# Patient Record
Sex: FEMALE | Race: WHITE | NOT HISPANIC OR LATINO | Employment: PART TIME | URBAN - METROPOLITAN AREA
[De-identification: names, ages, dates, MRNs, and addresses within clinical notes are randomized per-mention and may not be internally consistent; named-entity substitution may affect disease eponyms.]

---

## 2017-03-17 ENCOUNTER — ALLSCRIPTS OFFICE VISIT (OUTPATIENT)
Dept: OTHER | Facility: OTHER | Age: 67
End: 2017-03-17

## 2017-03-17 DIAGNOSIS — Z13.820 ENCOUNTER FOR SCREENING FOR OSTEOPOROSIS: ICD-10-CM

## 2017-04-27 ENCOUNTER — GENERIC CONVERSION - ENCOUNTER (OUTPATIENT)
Dept: OTHER | Facility: OTHER | Age: 67
End: 2017-04-27

## 2017-06-16 ENCOUNTER — ALLSCRIPTS OFFICE VISIT (OUTPATIENT)
Dept: OTHER | Facility: OTHER | Age: 67
End: 2017-06-16

## 2017-08-14 DIAGNOSIS — F32.9 MAJOR DEPRESSIVE DISORDER, SINGLE EPISODE: ICD-10-CM

## 2017-08-14 DIAGNOSIS — M54.9 DORSALGIA: ICD-10-CM

## 2017-08-14 DIAGNOSIS — I10 ESSENTIAL (PRIMARY) HYPERTENSION: ICD-10-CM

## 2017-10-17 ENCOUNTER — APPOINTMENT (OUTPATIENT)
Dept: LAB | Facility: MEDICAL CENTER | Age: 67
End: 2017-10-17
Payer: MEDICARE

## 2017-10-17 DIAGNOSIS — M54.9 DORSALGIA: ICD-10-CM

## 2017-10-17 DIAGNOSIS — I10 ESSENTIAL (PRIMARY) HYPERTENSION: ICD-10-CM

## 2017-10-17 LAB
ALBUMIN SERPL BCP-MCNC: 3.7 G/DL (ref 3.5–5)
ALP SERPL-CCNC: 65 U/L (ref 46–116)
ALT SERPL W P-5'-P-CCNC: 28 U/L (ref 12–78)
ANION GAP SERPL CALCULATED.3IONS-SCNC: 7 MMOL/L (ref 4–13)
AST SERPL W P-5'-P-CCNC: 11 U/L (ref 5–45)
BASOPHILS # BLD AUTO: 0.03 THOUSANDS/ΜL (ref 0–0.1)
BASOPHILS NFR BLD AUTO: 0 % (ref 0–1)
BILIRUB SERPL-MCNC: 0.48 MG/DL (ref 0.2–1)
BUN SERPL-MCNC: 19 MG/DL (ref 5–25)
CALCIUM SERPL-MCNC: 8.5 MG/DL (ref 8.3–10.1)
CHLORIDE SERPL-SCNC: 104 MMOL/L (ref 100–108)
CHOLEST SERPL-MCNC: 192 MG/DL (ref 50–200)
CO2 SERPL-SCNC: 27 MMOL/L (ref 21–32)
CREAT SERPL-MCNC: 0.88 MG/DL (ref 0.6–1.3)
EOSINOPHIL # BLD AUTO: 0.16 THOUSAND/ΜL (ref 0–0.61)
EOSINOPHIL NFR BLD AUTO: 2 % (ref 0–6)
ERYTHROCYTE [DISTWIDTH] IN BLOOD BY AUTOMATED COUNT: 13.1 % (ref 11.6–15.1)
GFR SERPL CREATININE-BSD FRML MDRD: 67 ML/MIN/1.73SQ M
GLUCOSE P FAST SERPL-MCNC: 102 MG/DL (ref 65–99)
HCT VFR BLD AUTO: 45 % (ref 34.8–46.1)
HDLC SERPL-MCNC: 60 MG/DL (ref 40–60)
HGB BLD-MCNC: 14.6 G/DL (ref 11.5–15.4)
LDLC SERPL CALC-MCNC: 110 MG/DL (ref 0–100)
LYMPHOCYTES # BLD AUTO: 2.4 THOUSANDS/ΜL (ref 0.6–4.47)
LYMPHOCYTES NFR BLD AUTO: 36 % (ref 14–44)
MCH RBC QN AUTO: 30.5 PG (ref 26.8–34.3)
MCHC RBC AUTO-ENTMCNC: 32.4 G/DL (ref 31.4–37.4)
MCV RBC AUTO: 94 FL (ref 82–98)
MONOCYTES # BLD AUTO: 0.48 THOUSAND/ΜL (ref 0.17–1.22)
MONOCYTES NFR BLD AUTO: 7 % (ref 4–12)
NEUTROPHILS # BLD AUTO: 3.65 THOUSANDS/ΜL (ref 1.85–7.62)
NEUTS SEG NFR BLD AUTO: 55 % (ref 43–75)
NRBC BLD AUTO-RTO: 0 /100 WBCS
PLATELET # BLD AUTO: 318 THOUSANDS/UL (ref 149–390)
PMV BLD AUTO: 11.4 FL (ref 8.9–12.7)
POTASSIUM SERPL-SCNC: 3.9 MMOL/L (ref 3.5–5.3)
PROT SERPL-MCNC: 7.1 G/DL (ref 6.4–8.2)
RBC # BLD AUTO: 4.78 MILLION/UL (ref 3.81–5.12)
SODIUM SERPL-SCNC: 138 MMOL/L (ref 136–145)
TRIGL SERPL-MCNC: 109 MG/DL
TSH SERPL DL<=0.05 MIU/L-ACNC: 2.78 UIU/ML (ref 0.36–3.74)
WBC # BLD AUTO: 6.73 THOUSAND/UL (ref 4.31–10.16)

## 2017-10-17 PROCEDURE — 84443 ASSAY THYROID STIM HORMONE: CPT

## 2017-10-17 PROCEDURE — 80061 LIPID PANEL: CPT

## 2017-10-17 PROCEDURE — 80053 COMPREHEN METABOLIC PANEL: CPT

## 2017-10-17 PROCEDURE — 36415 COLL VENOUS BLD VENIPUNCTURE: CPT

## 2017-10-17 PROCEDURE — 85025 COMPLETE CBC W/AUTO DIFF WBC: CPT

## 2017-10-19 ENCOUNTER — GENERIC CONVERSION - ENCOUNTER (OUTPATIENT)
Dept: OTHER | Facility: OTHER | Age: 67
End: 2017-10-19

## 2017-10-24 ENCOUNTER — ALLSCRIPTS OFFICE VISIT (OUTPATIENT)
Dept: OTHER | Facility: OTHER | Age: 67
End: 2017-10-24

## 2017-10-25 NOTE — PROGRESS NOTES
Assessment  1  Hypertension (401 9) (I10)   2  Depression (311) (F32 9)   3  Anxiety (300 00) (F41 9)    Plan  Depression    · PARoxetine HCl - 20 MG Oral Tablet; TAKE 1 TABLET DAILY AS DIRECTED  Hypertension    · Triamterene-HCTZ 37 5-25 MG Oral Tablet; TAKE ONE TABLET BY MOUTH  ONCE DAILY    Chief Complaint  The patient is here today for a follow up  History of Present Illness  The patient states she has been doing well with her blood pressure control since the last visit  She has no comorbid illnesses  She has no significant interval events  Symptoms: The patient is currently asymptomatic  The patient is being seen for an initial evaluation of anxiety disorder  The patient is currently asymptomatic  No associated symptoms are reported  Suicidal risk:  no suicidal thoughts-- and-- no suicidal intention  Current treatment includes selective serotonin reuptake inhibitors  There are no medication side effects  Review of Systems    Constitutional: No fever, no chills, feels well, no tiredness, no recent weight gain or weight loss  Eyes: No complaints of eye pain, no red eyes, no eyesight problems, no discharge, no dry eyes, no itching of eyes  ENT: no complaints of earache, no loss of hearing, no nose bleeds, no nasal discharge, no sore throat, no hoarseness  Cardiovascular: No complaints of slow heart rate, no fast heart rate, no chest pain, no palpitations, no leg claudication, no lower extremity edema  Respiratory: No complaints of shortness of breath, no wheezing, no cough, no SOB on exertion, no orthopnea, no PND  Gastrointestinal: No complaints of abdominal pain, no constipation, no nausea or vomiting, no diarrhea, no bloody stools  Genitourinary: No complaints of dysuria, no incontinence, no pelvic pain, no dysmenorrhea, no vaginal discharge or bleeding  Musculoskeletal: No complaints of arthralgias, no myalgias, no joint swelling or stiffness, no limb pain or swelling        Active Problems  1  Adjustment insomnia (307 41) (F51 02)   2  Anxiety (300 00) (F41 9)   3  Back pain (724 5) (M54 9)   4  Back strain (847 9) (S39 012A)   5  Colon cancer screening (V76 51) (Z12 11)   6  Contact dermatitis due to poison ivy (692 6) (L23 7)   7  Depression (311) (F32 9)   8  Encounter for screening mammogram for malignant neoplasm of breast (V76 12)   (Z12 31)   9  Flu vaccine need (V04 81) (Z23)   10  Hypertension (401 9) (I10)   11  Need for pneumococcal vaccine (V03 82) (Z23)   12  Need for shingles vaccine (V04 89) (Z23)   13  Need for tuberculosis vaccination (V03 2) (Z23)   14  Nevus of back (216 5) (D22 5)   15  Routine history and physical examination of adult (V70 0) (Z00 00)   16  Screening for osteoporosis (V82 81) (Z13 820)   17  Screening for tuberculosis (V74 1) (Z11 1)   18  Uterine cancer (179) (C55)    Past Medical History  1  Denied: History of Denial Of Any Significant Medical History    Surgical History  1  History of Total Abdominal Hysterectomy With Removal Of Both Ovaries    Family History  Mother    1  Family history of Diabetes Mellitus (V18 0)   2  Family history of Heart Disease (V17 49)  Family History    3  Family history of Father  At Age 80   4  Family history of Mother  At Age 80    Social History   · Never a smoker   · Never Drank Alcohol   · History of Non-smoker (V49 89) (Z78 9)    Current Meds   1  BL Calcium 500 TABS; Once a day Recorded   2  LORazepam 0 5 MG Oral Tablet; TAKE 1 TABLET EVERY 12 HOURS AS NEEDED; Therapy: 83Lrb7085 to (Evaluate:98Dhf9042); Last QN:06WWO0848 Ordered   3  Multi Vitamin/Minerals Oral Tablet Recorded   4  PARoxetine HCl - 20 MG Oral Tablet; TAKE 1 TABLET DAILY AS DIRECTED; Therapy: 89FWP3576 to (Evaluate:63Cqc9811)  Requested for: 01TDK2834; Last   Rx:2017 Ordered   5  Triamterene-HCTZ 37 5-25 MG Oral Tablet; TAKE ONE TABLET BY MOUTH ONCE DAILY;    Therapy: 28RRY0301 to (Evaluate:67Uip2488)  Requested for: 61LJT6162; Last   Rx:17Mar2017 Ordered   6  Zostavax 78751 UNT/0 65ML Subcutaneous Solution Reconstituted; adm  one dose as   directed; Therapy: 58DHL4190 to (Last Rx:17Mar2017) Ordered    Allergies  1  Amoxicillin TABS   2  Erythromycin TABS    Vitals  Vital Signs    Recorded: 87QYQ0134 02:17PM   Heart Rate 64   Respiration 16   Systolic 231   Diastolic 60   Weight 986 lb 4 oz   BMI Calculated 30 52   BSA Calculated 1 68     Physical Exam    Constitutional   General appearance: No acute distress, well appearing and well nourished  Ears, Nose, Mouth, and Throat   External inspection of ears and nose: Normal     Otoscopic examination: Tympanic membranes translucent with normal light reflex  Canals patent without erythema  Nasal mucosa, septum, and turbinates: Normal without edema or erythema  Oropharynx: Normal with no erythema, edema, exudate or lesions  Pulmonary   Auscultation of lungs: Clear to auscultation  Cardiovascular   Auscultation of heart: Normal rate and rhythm, normal S1 and S2, without murmurs  Abdomen   Abdomen: Non-tender, no masses  Future Appointments    Date/Time Provider Specialty Site   03/23/2018 09:00 AM ASIF Mc   [address] [facility]     Signatures   Electronically signed by : ASIF Evans ; Oct 24 2017  2:44PM EST                       (Author)

## 2018-01-11 NOTE — RESULT NOTES
Verified Results  (1) CBC/PLT/DIFF 89Ieq3518 10:40AM Laura Fast Order Number: [id number]     Test Name Result Flag Reference   WBC COUNT 6 14 Thousand/uL  4 31-10 16   RBC COUNT 4 25 Million/uL  3 81-5 12   HEMOGLOBIN 12 8 g/dL  11 5-15 4   HEMATOCRIT 41 1 %  34 8-46  1   MCV 97 fL  82-98   MCH 30 1 pg  26 8-34 3   MCHC 31 1 g/dL L 31 4-37 4   RDW 13 6 %  11 6-15 1   MPV 11 4 fL  8 9-12 7   PLATELET COUNT 663 Thousands/uL  149-390   nRBC AUTOMATED 0 /100 WBCs     NEUTROPHILS RELATIVE PERCENT 45 %  43-75   LYMPHOCYTES RELATIVE PERCENT 43 %  14-44   MONOCYTES RELATIVE PERCENT 8 %  4-12   EOSINOPHILS RELATIVE PERCENT 3 %  0-6   BASOPHILS RELATIVE PERCENT 1 %  0-1   NEUTROPHILS ABSOLUTE COUNT 2 85 Thousands/?L  1 85-7 62   LYMPHOCYTES ABSOLUTE COUNT 2 61 Thousands/?L  0 60-4 47   MONOCYTES ABSOLUTE COUNT 0 46 Thousand/?L  0 17-1 22   EOSINOPHILS ABSOLUTE COUNT 0 17 Thousand/?L  0 00-0 61   BASOPHILS ABSOLUTE COUNT 0 04 Thousands/?L  0 00-0 10

## 2018-01-12 VITALS
WEIGHT: 156.25 LBS | DIASTOLIC BLOOD PRESSURE: 60 MMHG | BODY MASS INDEX: 30.52 KG/M2 | RESPIRATION RATE: 16 BRPM | HEART RATE: 64 BPM | SYSTOLIC BLOOD PRESSURE: 124 MMHG

## 2018-01-13 VITALS
BODY MASS INDEX: 30.54 KG/M2 | DIASTOLIC BLOOD PRESSURE: 70 MMHG | RESPIRATION RATE: 18 BRPM | HEART RATE: 86 BPM | WEIGHT: 156.38 LBS | SYSTOLIC BLOOD PRESSURE: 118 MMHG

## 2018-01-13 VITALS
HEART RATE: 72 BPM | WEIGHT: 153.38 LBS | RESPIRATION RATE: 16 BRPM | BODY MASS INDEX: 29.95 KG/M2 | DIASTOLIC BLOOD PRESSURE: 60 MMHG | SYSTOLIC BLOOD PRESSURE: 132 MMHG

## 2018-01-15 NOTE — RESULT NOTES
Verified Results  (1) CBC/PLT/DIFF 17Oct2017 09:08AM Tanya Running   [employer] Order Number: [id number]     Test Name Result Flag Reference   WBC COUNT 6 73 Thousand/uL  4 31-10 16   RBC COUNT 4 78 Million/uL  3 81-5 12   HEMOGLOBIN 14 6 g/dL  11 5-15 4   HEMATOCRIT 45 0 %  34 8-46  1   MCV 94 fL  82-98   MCH 30 5 pg  26 8-34 3   MCHC 32 4 g/dL  31 4-37 4   RDW 13 1 %  11 6-15 1   MPV 11 4 fL  8 9-12 7   PLATELET COUNT 959 Thousands/uL  149-390   nRBC AUTOMATED 0 /100 WBCs     NEUTROPHILS RELATIVE PERCENT 55 %  43-75   LYMPHOCYTES RELATIVE PERCENT 36 %  14-44   MONOCYTES RELATIVE PERCENT 7 %  4-12   EOSINOPHILS RELATIVE PERCENT 2 %  0-6   BASOPHILS RELATIVE PERCENT 0 %  0-1   NEUTROPHILS ABSOLUTE COUNT 3 65 Thousands/? ??L  1 85-7 62   LYMPHOCYTES ABSOLUTE COUNT 2 40 Thousands/? ??L  0 60-4 47   MONOCYTES ABSOLUTE COUNT 0 48 Thousand/? ??L  0 17-1 22   EOSINOPHILS ABSOLUTE COUNT 0 16 Thousand/? ??L  0 00-0 61   BASOPHILS ABSOLUTE COUNT 0 03 Thousands/? ??L  0 00-0 10     (1) COMPREHENSIVE METABOLIC PANEL [id number] 33:05YK Tanya Bonfyre Order Number: [id number]     Test Name Result Flag Reference   SODIUM 138 mmol/L  136-145   POTASSIUM 3 9 mmol/L  3 5-5 3   CHLORIDE 104 mmol/L  100-108   CARBON DIOXIDE 27 mmol/L  21-32   ANION GAP (CALC) 7 mmol/L  4-13   BLOOD UREA NITROGEN 19 mg/dL  5-25   CREATININE 0 88 mg/dL  0 60-1 30   Standardized to IDMS reference method   CALCIUM 8 5 mg/dL  8 3-10 1   BILI, TOTAL 0 48 mg/dL  0 20-1 00   ALK PHOSPHATAS 65 U/L     ALT (SGPT) 28 U/L  12-78   Specimen collection should occur prior to Sulfasalazine and/or Sulfapyridine administration due to the potential for falsely depressed results  AST(SGOT) 11 U/L  5-45   Specimen collection should occur prior to Sulfasalazine administration due to the potential for falsely depressed results     ALBUMIN 3 7 g/dL  3 5-5 0   TOTAL PROTEIN 7 1 g/dL  6 4-8 2   eGFR 67 ml/min/1 73sq m     National Kidney Disease Education Program recommendations are as follows:  GFR calculation is accurate only with a steady state creatinine  Chronic Kidney disease less than 60 ml/min/1 73 sq  meters  Kidney failure less than 15 ml/min/1 73 sq  meters  GLUCOSE FASTING 102 mg/dL H 65-99   Specimen collection should occur prior to Sulfasalazine administration due to the potential for falsely depressed results  Specimen collection should occur prior to Sulfapyridine administration due to the potential for falsely elevated results  (1) LIPID PANEL FASTING W DIRECT LDL REFLEX 17Oct2017 09:08AM [employer] Order Number: [id number]     Test Name Result Flag Reference   CHOLESTEROL 192 mg/dL     LDL CHOLESTEROL CALCULATED 110 mg/dL H 0-100   Triglyceride:        Normal <150 mg/dl   Borderline High 150-199 mg/dl   High 200-499 mg/dl   Very High >499 mg/dl      Cholesterol:       Desirable <200 mg/dl    Borderline High 200-239 mg/dl    High >239 mg/dl      HDL Cholesterol:       High>59 mg/dL    Low <41 mg/dL      HDL Cholesterol:       High>59 mg/dL    Low <41 mg/dL      This screening LDL is a calculated result  It does not have the accuracy of the Direct Measured LDL in the monitoring of patients with hyperlipidemia and/or statin therapy  Direct Measure LDL (UNJ272) must be ordered separately in these patients  TRIGLYCERIDES 109 mg/dL  <=150   Specimen collection should occur prior to N-Acetylcysteine or Metamizole administration due to the potential for falsely depressed results  HDL,DIRECT 60 mg/dL  40-60   Specimen collection should occur prior to Metamizole administration due to the potential for falsley depressed results  (1) TSH 17Oct2017 09:08AM [employer] Order Number: [id number]     Test Name Result Flag Reference   TSH 2 780 uIU/mL  0 358-3 740   Patients undergoing fluorescein dye angiography may retain small amounts of fluorescein in the body for 48-72 hours post procedure   Samples containing fluorescein can produce falsely depressed TSH values  If the patient had this procedure,a specimen should be resubmitted post fluorescein clearance            The recommended reference ranges for TSH during pregnancy are as follows:  First trimester 0 1 to 2 5 uIU/mL  Second trimester  0 2 to 3 0 uIU/mL  Third trimester 0 3 to 3 0 uIU/m

## 2018-06-05 ENCOUNTER — CLINICAL SUPPORT (OUTPATIENT)
Dept: FAMILY MEDICINE CLINIC | Facility: MEDICAL CENTER | Age: 68
End: 2018-06-05
Payer: MEDICARE

## 2018-06-05 DIAGNOSIS — Z11.1 PPD SCREENING TEST: Primary | ICD-10-CM

## 2018-06-05 PROCEDURE — 86580 TB INTRADERMAL TEST: CPT

## 2018-06-07 LAB
INDURATION: 0 MM
TB SKIN TEST: NEGATIVE

## 2018-07-02 DIAGNOSIS — I10 ESSENTIAL HYPERTENSION: Primary | ICD-10-CM

## 2018-07-05 RX ORDER — TRIAMTERENE AND HYDROCHLOROTHIAZIDE 37.5; 25 MG/1; MG/1
TABLET ORAL
Qty: 90 TABLET | Refills: 1 | Status: SHIPPED | OUTPATIENT
Start: 2018-07-05 | End: 2019-02-01 | Stop reason: SDUPTHER

## 2018-09-07 DIAGNOSIS — F41.1 GAD (GENERALIZED ANXIETY DISORDER): Primary | ICD-10-CM

## 2018-09-07 RX ORDER — PAROXETINE HYDROCHLORIDE 20 MG/1
TABLET, FILM COATED ORAL
Qty: 90 TABLET | Refills: 1 | Status: SHIPPED | OUTPATIENT
Start: 2018-09-07 | End: 2018-09-11 | Stop reason: SDUPTHER

## 2018-09-11 DIAGNOSIS — F41.1 GAD (GENERALIZED ANXIETY DISORDER): ICD-10-CM

## 2018-09-11 RX ORDER — PAROXETINE HYDROCHLORIDE 20 MG/1
20 TABLET, FILM COATED ORAL DAILY
Qty: 90 TABLET | Refills: 1 | Status: SHIPPED | OUTPATIENT
Start: 2018-09-11 | End: 2019-09-13 | Stop reason: SDUPTHER

## 2019-02-01 DIAGNOSIS — I10 ESSENTIAL HYPERTENSION: ICD-10-CM

## 2019-02-03 RX ORDER — TRIAMTERENE AND HYDROCHLOROTHIAZIDE 37.5; 25 MG/1; MG/1
TABLET ORAL
Qty: 90 TABLET | Refills: 1 | Status: SHIPPED | OUTPATIENT
Start: 2019-02-03 | End: 2019-10-29 | Stop reason: SDUPTHER

## 2019-06-04 ENCOUNTER — CLINICAL SUPPORT (OUTPATIENT)
Dept: FAMILY MEDICINE CLINIC | Facility: MEDICAL CENTER | Age: 69
End: 2019-06-04
Payer: MEDICARE

## 2019-06-04 DIAGNOSIS — Z11.1 SCREENING FOR TUBERCULOSIS: Primary | ICD-10-CM

## 2019-06-04 PROCEDURE — 86580 TB INTRADERMAL TEST: CPT

## 2019-06-06 ENCOUNTER — CLINICAL SUPPORT (OUTPATIENT)
Dept: FAMILY MEDICINE CLINIC | Facility: MEDICAL CENTER | Age: 69
End: 2019-06-06

## 2019-06-06 DIAGNOSIS — Z11.1 ENCOUNTER FOR PPD SKIN TEST READING: Primary | ICD-10-CM

## 2019-06-06 LAB
INDURATION: 0 MM
TB SKIN TEST: NEGATIVE

## 2019-09-03 ENCOUNTER — OFFICE VISIT (OUTPATIENT)
Dept: FAMILY MEDICINE CLINIC | Facility: MEDICAL CENTER | Age: 69
End: 2019-09-03
Payer: MEDICARE

## 2019-09-03 VITALS
HEART RATE: 70 BPM | SYSTOLIC BLOOD PRESSURE: 138 MMHG | WEIGHT: 147.25 LBS | RESPIRATION RATE: 14 BRPM | HEIGHT: 60 IN | BODY MASS INDEX: 28.91 KG/M2 | DIASTOLIC BLOOD PRESSURE: 70 MMHG

## 2019-09-03 DIAGNOSIS — Z23 NEED FOR PROPHYLACTIC VACCINATION AGAINST STREPTOCOCCUS PNEUMONIAE (PNEUMOCOCCUS): Primary | ICD-10-CM

## 2019-09-03 DIAGNOSIS — F41.9 ANXIETY: ICD-10-CM

## 2019-09-03 DIAGNOSIS — I10 HYPERTENSION, UNSPECIFIED TYPE: ICD-10-CM

## 2019-09-03 DIAGNOSIS — Z13.29 SCREENING FOR THYROID DISORDER: ICD-10-CM

## 2019-09-03 DIAGNOSIS — Z13.220 SCREENING FOR LIPID DISORDERS: ICD-10-CM

## 2019-09-03 DIAGNOSIS — Z13.1 SCREENING FOR DIABETES MELLITUS: ICD-10-CM

## 2019-09-03 DIAGNOSIS — Z12.39 SCREENING FOR MALIGNANT NEOPLASM OF BREAST: ICD-10-CM

## 2019-09-03 PROCEDURE — 99214 OFFICE O/P EST MOD 30 MIN: CPT | Performed by: FAMILY MEDICINE

## 2019-09-03 PROCEDURE — G0439 PPPS, SUBSEQ VISIT: HCPCS | Performed by: FAMILY MEDICINE

## 2019-09-03 PROCEDURE — G0009 ADMIN PNEUMOCOCCAL VACCINE: HCPCS | Performed by: FAMILY MEDICINE

## 2019-09-03 PROCEDURE — 90670 PCV13 VACCINE IM: CPT | Performed by: FAMILY MEDICINE

## 2019-09-03 RX ORDER — GLUC/MSM/COLGN2/HYAL/ANTIARTH3 375-375-20
1 TABLET ORAL
COMMUNITY

## 2019-09-03 NOTE — PROGRESS NOTES
Assessment and Plan:     Problem List Items Addressed This Visit     None         History of Present Illness:     Patient presents for Medicare Annual Wellness visit    Patient Care Team:  Eb Ramirez MD as PCP - MD Ever Saldaña MD     Problem List:     There is no problem list on file for this patient  Past Medical and Surgical History:     No past medical history on file  Past Surgical History:   Procedure Laterality Date    ABDOMINAL HYSTERECTOMY      with removal of both ovaries       Family History:     Family History   Problem Relation Age of Onset    Diabetes Mother     Heart disease Mother       Social History:     Social History     Tobacco Use   Smoking Status Never Smoker   Smokeless Tobacco Never Used     Social History     Substance and Sexual Activity   Alcohol Use No     Social History     Substance and Sexual Activity   Drug Use Not on file      Medications and Allergies:     Current Outpatient Medications   Medication Sig Dispense Refill    Calcium Carbonate-Vitamin D 600-200 MG-UNIT CAPS Take 1 tablet by mouth      Multiple Vitamins-Minerals (MULTIVITAMIN ADULT EXTRA C PO) Take 1 tablet by mouth daily      PARoxetine (PAXIL) 20 mg tablet Take 1 tablet (20 mg total) by mouth daily 90 tablet 1    triamterene-hydrochlorothiazide (MAXZIDE-25) 37 5-25 mg per tablet TAKE 1 TABLET BY MOUTH ONCE DAILY 90 tablet 1     No current facility-administered medications for this visit        Allergies   Allergen Reactions    Erythromycin Base Other (See Comments)     Rash DIARRHEA    Penicillins Other (See Comments)     ALL MYCINS RASH        Immunizations:     Immunization History   Administered Date(s) Administered    Influenza TIV (IM) 02/19/2016    Pneumococcal Polysaccharide PPV23 03/17/2017    Tuberculin Skin Test-PPD Intradermal 01/06/2016, 01/18/2016, 03/21/2017, 06/05/2018, 06/04/2019      Medicare Screening Tests and Risk Assessments:     Rasheeda Sanchez is here for her Subsequent Wellness visit  Health Risk Assessment:  Patient rates overall health as good  Patient feels that their physical health rating is Same  Eyesight was rated as Same  Hearing was rated as Same  Patient feels that their emotional and mental health rating is Same  Pain experienced by patient in the last 7 days has been None  Patient states that she has experienced no weight loss or gain in last 6 months  Emotional/Mental Health:  Patient has not been feeling nervous/anxious  PHQ-9 Depression Screening:    Frequency of the following problems over the past two weeks:      1  Little interest or pleasure in doing things: 0 - not at all      2  Feeling down, depressed, or hopeless: 0 - not at all  PHQ-2 Score: 0          Broken Bones/Falls: Fall Risk Assessment:    In the past year, patient has experienced: No history of falling in past year          Bladder/Bowel:  Patient has not leaked urine accidently in the last six months  Patient reports no loss of bowel control  Immunizations:  Patient has received a pneumonia shot  Patient has not received a shingles shot  Patient has not received tetanus/diphtheria shot  Home Safety:  Patient does not have trouble with stairs inside or outside of their home  Patient currently reports that there are safety hazards present in home , working smoke alarms, no working carbon monoxide detectors  (Additional Comments: Poor lighting in the hallway upstairs, hand rail going upstairs is broken; pulled out from the wall, and poor lighting in her kitchen as well  )    Preventative Screenings:   Breast cancer screening performed, no colon cancer screen completed, cholesterol screen completed, no glaucoma eye exam completed    Nutrition:  Current diet: Low Carb with servings of the following:    Medications:  Patient is currently taking over-the-counter supplements       List of OTC medications includes: Vitamins  Patient is able to manage medications  Lifestyle Choices:  Patient reports no tobacco use  Patient has not smoked or used tobacco in the past   Patient reports no alcohol use  Patient drives a vehicle  Patient wears seat belt  Current level of exercise of physical activity described by patient as: Walks a lot           Activities of Daily Living:  Can get out of bed by his or her self, able to dress self, able to make own meals, able to do own shopping, able to bathe self, can do own laundry/housekeeping, can manage own money, pay bills and track expenses    Previous Hospitalizations:  Hospitalization or ED visit in past 12 months  Number of hospitalizations within the last year: 1-2  Additional Comments: ED visit for bug bites  Advanced Directives:  Patient has decided on a power of   Patient has not spoken to designated power of   Patient has not completed advanced directive

## 2019-09-03 NOTE — PROGRESS NOTES
Patient lives at home independently  Patient has no concerns about the status of his health at this time  Patient lives with spouse  Has an active social life and is not socially isolated  There have been no behavioral problems  Patient is completely independent  Able to dress, bathe, ambulate, feed self etc  Patient is able to drive an automobile  There are no limitations with the patient shopping, housekeeping, yard maintenance etc     Patient tries to eat a balanced diet incorporating both the of vegetables and lean meats  Patient gets exercise by trying to walk most days  20-30 minutes  There has been no unexplained weight loss or weight gain  Patient does not have advanced directives  Patient's home is well lit and  uncluttered  Night lights are used at night  Grab bars are available in the bathroom  Patient's other healthcare providers, if any, are listed in the appropriate section of this chart  Patient's vital signs and BMI were reviewed and available on this chart  Screening for depression, urinary incontinence and fall risk were performed today and those results are available in the screening section of this chart  The patient is completely oriented alert and conversant  Appropriate thought and affect      The patient's "timed up and go test" is normal (easily under 12 seconds)    Patient gets annual Mammo, and F/U for Utx Ca  Declines Colon Screening

## 2019-09-06 NOTE — PROGRESS NOTES
Assessment/Plan:    Hypertension  Blood pressure currently well controlled with Dyazide  Continue Dyazide, low-salt diet, exercise  Anxiety  Patient is currently treated with paroxetine 20 mg  It is doing a good job for her  She has a difficult relationship with her  present time and it has been this way for quite a while  Also there is stress with her kids  She does take care of herself emotionally she has friends she does get out of the house and works part-time  Diagnoses and all orders for this visit:    Need for prophylactic vaccination against Streptococcus pneumoniae (pneumococcus)  -     PNEUMOCOCCAL CONJUGATE VACCINE 13-VALENT GREATER THAN 6 MONTHS    Hypertension, unspecified type  -     Comprehensive metabolic panel; Future  -     CBC and differential; Future    Screening for malignant neoplasm of breast    Screening for lipid disorders  -     Lipid Panel with Direct LDL reflex; Future    Screening for thyroid disorder  -     TSH, 3rd generation with Free T4 reflex; Future    Screening for diabetes mellitus    Anxiety    Other orders  -     Calcium Carbonate-Vitamin D 600-200 MG-UNIT CAPS; Take 1 tablet by mouth  -     Multiple Vitamins-Minerals (MULTIVITAMIN ADULT EXTRA C PO); Take 1 tablet by mouth daily          Subjective:      Patient ID: Anthony Arthur is a 68 y o  female  Here for routine follow up of medical problems  Please see assesment and plan for discussion  The following portions of the patient's history were reviewed and updated as appropriate: allergies, current medications, past family history, past medical history, past social history, past surgical history and problem list     Review of Systems   Constitutional: Negative for activity change, appetite change, fatigue and fever  HENT: Negative for congestion, ear pain, hearing loss, nosebleeds, postnasal drip, rhinorrhea and trouble swallowing      Eyes: Negative for photophobia, pain, redness and visual disturbance  Respiratory: Negative for cough, chest tightness, shortness of breath and wheezing  Cardiovascular: Negative for chest pain and palpitations  Gastrointestinal: Negative for abdominal pain, blood in stool, constipation, diarrhea, nausea and vomiting  Endocrine: Negative for cold intolerance, heat intolerance, polydipsia, polyphagia and polyuria  Genitourinary: Negative for difficulty urinating, dyspareunia, dysuria, flank pain, frequency, menstrual problem, pelvic pain, urgency, vaginal bleeding and vaginal discharge  Musculoskeletal: Negative for arthralgias, gait problem, joint swelling and myalgias  Skin: Negative for rash and wound  Neurological: Negative for dizziness, tremors, seizures, syncope, speech difficulty, weakness, light-headedness and headaches  Psychiatric/Behavioral: Negative for agitation, decreased concentration, dysphoric mood, sleep disturbance and suicidal ideas  The patient is not nervous/anxious  Objective:      /70   Pulse 70   Resp 14   Ht 4' 11 5" (1 511 m)   Wt 66 8 kg (147 lb 4 oz)   BMI 29 24 kg/m²          Physical Exam   Constitutional: She is oriented to person, place, and time  Vital signs are normal  She appears well-developed and well-nourished  She is cooperative  HENT:   Head: Normocephalic  Right Ear: Hearing, external ear and ear canal normal    Left Ear: Hearing, tympanic membrane, external ear and ear canal normal    Nose: Nose normal  No mucosal edema or rhinorrhea  Mouth/Throat: Uvula is midline, oropharynx is clear and moist and mucous membranes are normal  No oropharyngeal exudate  Eyes: Pupils are equal, round, and reactive to light  Conjunctivae, EOM and lids are normal    Neck: Carotid bruit is not present  No thyroid mass and no thyromegaly present  Cardiovascular: Normal rate, regular rhythm, S1 normal, S2 normal, normal heart sounds and normal pulses  Exam reveals no gallop     No murmur heard   Pulmonary/Chest: Effort normal and breath sounds normal  She has no wheezes  She has no rales  Abdominal: Soft  Normal appearance, normal aorta and bowel sounds are normal  There is no hepatosplenomegaly  There is no tenderness  There is no CVA tenderness  No hernia  Musculoskeletal: Normal range of motion  Lymphadenopathy:     She has no cervical adenopathy  Neurological: She is oriented to person, place, and time  She has normal strength  No cranial nerve deficit or sensory deficit  Coordination normal    Skin: Skin is warm, dry and intact  No rash noted  Psychiatric: She has a normal mood and affect  Her speech is normal and behavior is normal  Judgment and thought content normal  Cognition and memory are normal    Nursing note and vitals reviewed

## 2019-09-06 NOTE — ASSESSMENT & PLAN NOTE
Patient is currently treated with paroxetine 20 mg  It is doing a good job for her  She has a difficult relationship with her  present time and it has been this way for quite a while  Also there is stress with her kids  She does take care of herself emotionally she has friends she does get out of the house and works part-time

## 2019-09-13 DIAGNOSIS — F41.1 GAD (GENERALIZED ANXIETY DISORDER): ICD-10-CM

## 2019-09-13 RX ORDER — PAROXETINE HYDROCHLORIDE 20 MG/1
TABLET, FILM COATED ORAL
Qty: 90 TABLET | Refills: 1 | Status: SHIPPED | OUTPATIENT
Start: 2019-09-13 | End: 2020-05-04

## 2019-10-29 DIAGNOSIS — I10 ESSENTIAL HYPERTENSION: ICD-10-CM

## 2019-10-30 RX ORDER — TRIAMTERENE AND HYDROCHLOROTHIAZIDE 37.5; 25 MG/1; MG/1
TABLET ORAL
Qty: 90 TABLET | Refills: 1 | Status: SHIPPED | OUTPATIENT
Start: 2019-10-30 | End: 2020-06-04

## 2020-01-06 ENCOUNTER — TELEPHONE (OUTPATIENT)
Dept: FAMILY MEDICINE CLINIC | Facility: MEDICAL CENTER | Age: 70
End: 2020-01-06

## 2020-01-08 DIAGNOSIS — F41.9 ANXIETY: Primary | ICD-10-CM

## 2020-01-08 RX ORDER — LORAZEPAM 0.5 MG/1
0.5 TABLET ORAL EVERY 8 HOURS PRN
Qty: 15 TABLET | Refills: 0 | Status: SHIPPED | OUTPATIENT
Start: 2020-01-08 | End: 2021-10-15 | Stop reason: SDUPTHER

## 2020-01-08 NOTE — TELEPHONE ENCOUNTER
I sent a small amount in    This cannot be long term, and only occasionally    Is she still taking the Paroxetine    Make sure she has an upcoming routine appt

## 2020-01-08 NOTE — TELEPHONE ENCOUNTER
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

## 2020-03-02 ENCOUNTER — APPOINTMENT (OUTPATIENT)
Dept: LAB | Facility: CLINIC | Age: 70
End: 2020-03-02
Payer: MEDICARE

## 2020-03-02 ENCOUNTER — TRANSCRIBE ORDERS (OUTPATIENT)
Dept: LAB | Facility: CLINIC | Age: 70
End: 2020-03-02

## 2020-03-02 DIAGNOSIS — I10 HYPERTENSION, UNSPECIFIED TYPE: ICD-10-CM

## 2020-03-02 DIAGNOSIS — Z13.220 SCREENING FOR LIPID DISORDERS: ICD-10-CM

## 2020-03-02 DIAGNOSIS — Z13.29 SCREENING FOR THYROID DISORDER: ICD-10-CM

## 2020-03-02 LAB
ALBUMIN SERPL BCP-MCNC: 3.6 G/DL (ref 3.5–5)
ALP SERPL-CCNC: 63 U/L (ref 46–116)
ALT SERPL W P-5'-P-CCNC: 27 U/L (ref 12–78)
ANION GAP SERPL CALCULATED.3IONS-SCNC: 4 MMOL/L (ref 4–13)
AST SERPL W P-5'-P-CCNC: 11 U/L (ref 5–45)
BASOPHILS # BLD AUTO: 0.03 THOUSANDS/ΜL (ref 0–0.1)
BASOPHILS NFR BLD AUTO: 1 % (ref 0–1)
BILIRUB SERPL-MCNC: 0.35 MG/DL (ref 0.2–1)
BUN SERPL-MCNC: 20 MG/DL (ref 5–25)
CALCIUM SERPL-MCNC: 8.4 MG/DL (ref 8.3–10.1)
CHLORIDE SERPL-SCNC: 108 MMOL/L (ref 100–108)
CHOLEST SERPL-MCNC: 194 MG/DL (ref 50–200)
CO2 SERPL-SCNC: 27 MMOL/L (ref 21–32)
CREAT SERPL-MCNC: 0.88 MG/DL (ref 0.6–1.3)
EOSINOPHIL # BLD AUTO: 0.14 THOUSAND/ΜL (ref 0–0.61)
EOSINOPHIL NFR BLD AUTO: 2 % (ref 0–6)
ERYTHROCYTE [DISTWIDTH] IN BLOOD BY AUTOMATED COUNT: 13 % (ref 11.6–15.1)
GFR SERPL CREATININE-BSD FRML MDRD: 66 ML/MIN/1.73SQ M
GLUCOSE P FAST SERPL-MCNC: 96 MG/DL (ref 65–99)
HCT VFR BLD AUTO: 42.1 % (ref 34.8–46.1)
HDLC SERPL-MCNC: 59 MG/DL
HGB BLD-MCNC: 13 G/DL (ref 11.5–15.4)
IMM GRANULOCYTES # BLD AUTO: 0.02 THOUSAND/UL (ref 0–0.2)
IMM GRANULOCYTES NFR BLD AUTO: 0 % (ref 0–2)
LDLC SERPL CALC-MCNC: 112 MG/DL (ref 0–100)
LYMPHOCYTES # BLD AUTO: 2.34 THOUSANDS/ΜL (ref 0.6–4.47)
LYMPHOCYTES NFR BLD AUTO: 36 % (ref 14–44)
MCH RBC QN AUTO: 29.8 PG (ref 26.8–34.3)
MCHC RBC AUTO-ENTMCNC: 30.9 G/DL (ref 31.4–37.4)
MCV RBC AUTO: 97 FL (ref 82–98)
MONOCYTES # BLD AUTO: 0.55 THOUSAND/ΜL (ref 0.17–1.22)
MONOCYTES NFR BLD AUTO: 9 % (ref 4–12)
NEUTROPHILS # BLD AUTO: 3.41 THOUSANDS/ΜL (ref 1.85–7.62)
NEUTS SEG NFR BLD AUTO: 52 % (ref 43–75)
NRBC BLD AUTO-RTO: 0 /100 WBCS
PLATELET # BLD AUTO: 289 THOUSANDS/UL (ref 149–390)
PMV BLD AUTO: 11.6 FL (ref 8.9–12.7)
POTASSIUM SERPL-SCNC: 3.9 MMOL/L (ref 3.5–5.3)
PROT SERPL-MCNC: 6.6 G/DL (ref 6.4–8.2)
RBC # BLD AUTO: 4.36 MILLION/UL (ref 3.81–5.12)
SODIUM SERPL-SCNC: 139 MMOL/L (ref 136–145)
TRIGL SERPL-MCNC: 113 MG/DL
TSH SERPL DL<=0.05 MIU/L-ACNC: 2.79 UIU/ML (ref 0.36–3.74)
WBC # BLD AUTO: 6.49 THOUSAND/UL (ref 4.31–10.16)

## 2020-03-02 PROCEDURE — 80061 LIPID PANEL: CPT

## 2020-03-02 PROCEDURE — 80053 COMPREHEN METABOLIC PANEL: CPT

## 2020-03-02 PROCEDURE — 36415 COLL VENOUS BLD VENIPUNCTURE: CPT

## 2020-03-02 PROCEDURE — 85025 COMPLETE CBC W/AUTO DIFF WBC: CPT

## 2020-03-02 PROCEDURE — 84443 ASSAY THYROID STIM HORMONE: CPT

## 2020-03-03 ENCOUNTER — OFFICE VISIT (OUTPATIENT)
Dept: FAMILY MEDICINE CLINIC | Facility: MEDICAL CENTER | Age: 70
End: 2020-03-03
Payer: MEDICARE

## 2020-03-03 VITALS
HEIGHT: 60 IN | SYSTOLIC BLOOD PRESSURE: 130 MMHG | BODY MASS INDEX: 30.53 KG/M2 | HEART RATE: 80 BPM | WEIGHT: 155.5 LBS | DIASTOLIC BLOOD PRESSURE: 70 MMHG | RESPIRATION RATE: 14 BRPM

## 2020-03-03 DIAGNOSIS — F41.9 ANXIETY: Primary | ICD-10-CM

## 2020-03-03 DIAGNOSIS — I10 HYPERTENSION, UNSPECIFIED TYPE: ICD-10-CM

## 2020-03-03 PROCEDURE — 4040F PNEUMOC VAC/ADMIN/RCVD: CPT | Performed by: FAMILY MEDICINE

## 2020-03-03 PROCEDURE — 99214 OFFICE O/P EST MOD 30 MIN: CPT | Performed by: FAMILY MEDICINE

## 2020-03-03 PROCEDURE — 3075F SYST BP GE 130 - 139MM HG: CPT | Performed by: FAMILY MEDICINE

## 2020-03-03 PROCEDURE — 1036F TOBACCO NON-USER: CPT | Performed by: FAMILY MEDICINE

## 2020-03-03 PROCEDURE — 3078F DIAST BP <80 MM HG: CPT | Performed by: FAMILY MEDICINE

## 2020-03-03 PROCEDURE — 3008F BODY MASS INDEX DOCD: CPT | Performed by: FAMILY MEDICINE

## 2020-03-03 PROCEDURE — 1160F RVW MEDS BY RX/DR IN RCRD: CPT | Performed by: FAMILY MEDICINE

## 2020-05-04 DIAGNOSIS — F41.1 GAD (GENERALIZED ANXIETY DISORDER): ICD-10-CM

## 2020-05-04 RX ORDER — PAROXETINE HYDROCHLORIDE 20 MG/1
TABLET, FILM COATED ORAL
Qty: 90 TABLET | Refills: 0 | Status: SHIPPED | OUTPATIENT
Start: 2020-05-04 | End: 2020-08-20

## 2020-06-03 DIAGNOSIS — I10 ESSENTIAL HYPERTENSION: ICD-10-CM

## 2020-06-04 RX ORDER — TRIAMTERENE AND HYDROCHLOROTHIAZIDE 37.5; 25 MG/1; MG/1
TABLET ORAL
Qty: 90 TABLET | Refills: 3 | Status: SHIPPED | OUTPATIENT
Start: 2020-06-04 | End: 2021-10-08

## 2020-06-29 ENCOUNTER — CLINICAL SUPPORT (OUTPATIENT)
Dept: FAMILY MEDICINE CLINIC | Facility: MEDICAL CENTER | Age: 70
End: 2020-06-29
Payer: MEDICARE

## 2020-06-29 DIAGNOSIS — Z11.1 PPD SCREENING TEST: Primary | ICD-10-CM

## 2020-06-29 PROCEDURE — 86580 TB INTRADERMAL TEST: CPT

## 2020-07-01 ENCOUNTER — CLINICAL SUPPORT (OUTPATIENT)
Dept: FAMILY MEDICINE CLINIC | Facility: MEDICAL CENTER | Age: 70
End: 2020-07-01

## 2020-07-01 DIAGNOSIS — Z11.1 ENCOUNTER FOR PPD SKIN TEST READING: Primary | ICD-10-CM

## 2020-07-01 LAB — TB SKIN TEST: NEGATIVE

## 2020-08-20 DIAGNOSIS — F41.1 GAD (GENERALIZED ANXIETY DISORDER): ICD-10-CM

## 2020-08-20 RX ORDER — PAROXETINE HYDROCHLORIDE 20 MG/1
TABLET, FILM COATED ORAL
Qty: 90 TABLET | Refills: 0 | Status: SHIPPED | OUTPATIENT
Start: 2020-08-20 | End: 2020-12-20

## 2020-09-08 ENCOUNTER — OFFICE VISIT (OUTPATIENT)
Dept: FAMILY MEDICINE CLINIC | Facility: MEDICAL CENTER | Age: 70
End: 2020-09-08
Payer: MEDICARE

## 2020-09-08 VITALS
BODY MASS INDEX: 31.8 KG/M2 | OXYGEN SATURATION: 93 % | DIASTOLIC BLOOD PRESSURE: 78 MMHG | WEIGHT: 162 LBS | HEIGHT: 60 IN | HEART RATE: 93 BPM | SYSTOLIC BLOOD PRESSURE: 118 MMHG | TEMPERATURE: 98.3 F

## 2020-09-08 DIAGNOSIS — L55.9 SUNBURN: Primary | ICD-10-CM

## 2020-09-08 DIAGNOSIS — C54.9 MALIGNANT NEOPLASM OF CORPUS UTERI (HCC): ICD-10-CM

## 2020-09-08 PROCEDURE — 99214 OFFICE O/P EST MOD 30 MIN: CPT | Performed by: FAMILY MEDICINE

## 2020-09-08 PROCEDURE — G0439 PPPS, SUBSEQ VISIT: HCPCS | Performed by: FAMILY MEDICINE

## 2020-09-08 RX ORDER — HYDROCORTISONE 25 MG/ML
LOTION TOPICAL 2 TIMES DAILY
Qty: 118 ML | Refills: 0 | Status: SHIPPED | OUTPATIENT
Start: 2020-09-08 | End: 2021-10-15

## 2020-09-08 NOTE — PROGRESS NOTES
Assessment and Plan:     Problem List Items Addressed This Visit     None           Preventive health issues were discussed with patient, and age appropriate screening tests were ordered as noted in patient's After Visit Summary  Personalized health advice and appropriate referrals for health education or preventive services given if needed, as noted in patient's After Visit Summary       History of Present Illness:     Patient presents for Medicare Annual Wellness visit    Patient Care Team:  Burton Montanez MD as PCP - MD Dana Aguirre MD     Problem List:     Patient Active Problem List   Diagnosis    Uterine cancer ([facility] )    Hypertension    Anxiety      Past Medical and Surgical History:     Past Medical History:   Diagnosis Date    Anxiety     Cancer ([facility] )     Uterine Ca and Skin    Hypertension      Past Surgical History:   Procedure Laterality Date    ABDOMINAL HYSTERECTOMY      with removal of both ovaries     HYSTERECTOMY        Family History:     Family History   Problem Relation Age of Onset    Diabetes Mother     Heart disease Mother     Stroke Mother     Kidney cancer Brother       Social History:        Social History     Socioeconomic History    Marital status: /Civil Union     Spouse name: Not on file    Number of children: Not on file    Years of education: Not on file    Highest education level: Not on file   Occupational History    Not on file   Social Needs    Financial resource strain: Not on file    Food insecurity     Worry: Not on file     Inability: Not on file   [employer] needs     Medical: Not on file     Non-medical: Not on file   Tobacco Use    Smoking status: Never Smoker    Smokeless tobacco: Never Used   Substance and Sexual Activity    Alcohol use: No    Drug use: Not on file    Sexual activity: Not on file   Lifestyle    Physical activity     Days per week: Not on file     Minutes per session: Not on file    Stress: Not on file   Relationships    Social connections     Talks on phone: Not on file     Gets together: Not on file     Attends Mu-ism service: Not on file     Active member of club or organization: Not on file     Attends meetings of clubs or organizations: Not on file     Relationship status: Not on file    Intimate partner violence     Fear of current or ex partner: Not on file     Emotionally abused: Not on file     Physically abused: Not on file     Forced sexual activity: Not on file   Other Topics Concern    Not on file   Social History Narrative    Not on file      Medications and Allergies:     Current Outpatient Medications   Medication Sig Dispense Refill    Calcium Carbonate-Vitamin D 600-200 MG-UNIT CAPS Take 1 tablet by mouth      LORazepam (ATIVAN) 0 5 mg tablet Take 1 tablet (0 5 mg total) by mouth every 8 (eight) hours as needed for anxiety 15 tablet 0    Multiple Vitamins-Minerals (MULTIVITAMIN ADULT EXTRA C PO) Take 1 tablet by mouth daily      PARoxetine (PAXIL) 20 mg tablet Take 1 tablet by mouth once daily 90 tablet 0    triamterene-hydrochlorothiazide (MAXZIDE-25) 37 5-25 mg per tablet Take 1 tablet by mouth once daily 90 tablet 3     No current facility-administered medications for this visit        Allergies   Allergen Reactions    Erythromycin Base Other (See Comments)     Rash DIARRHEA    Penicillins Other (See Comments)     ALL MYCINS RASH        Immunizations:     Immunization History   Administered Date(s) Administered    Influenza TIV (IM) 02/19/2016    Pneumococcal Conjugate 13-Valent 09/03/2019    Pneumococcal Polysaccharide PPV23 03/17/2017    Tuberculin Skin Test-PPD Intradermal 01/06/2016, 01/18/2016, 03/21/2017, 06/05/2018, 06/04/2019, 06/29/2020      Health Maintenance:         Topic Date Due    Hepatitis C Screening  1950    MAMMOGRAM  03/15/2020    DXA SCAN  04/30/2023         Topic Date Due    DTaP,Tdap,and Td Vaccines (1 - Tdap) 09/02/1969    Influenza Vaccine  07/01/2020      Medicare Health Risk Assessment:     Ht 4' 11 5" (1 511 m)   BMI 30 88 kg/m²      Amaury Reid is here for her Subsequent Wellness visit  Health Risk Assessment:   Patient rates overall health as very good  Patient feels that their physical health rating is same  Eyesight was rated as same  Hearing was rated as same  Patient feels that their emotional and mental health rating is slightly worse  Pain experienced in the last 7 days has been none  Patient states that she has experienced no weight loss or gain in last 6 months  Depression Screening:   PHQ-2 Score: 0      Fall Risk Screening: In the past year, patient has experienced: no history of falling in past year      Urinary Incontinence Screening:   Patient has not leaked urine accidently in the last six months  Home Safety:  Patient does not have trouble with stairs inside or outside of their home  Patient has no working smoke alarms and has no working carbon monoxide detector  Home safety hazards include: none  Nutrition:   Current diet is Limited junk food and Regular  Medications:   Patient is currently taking over-the-counter supplements  OTC medications include: see medication list  Patient is able to manage medications  Activities of Daily Living (ADLs)/Instrumental Activities of Daily Living (IADLs):   Walk and transfer into and out of bed and chair?: Yes  Dress and groom yourself?: Yes    Bathe or shower yourself?: Yes    Feed yourself?  Yes  Do your laundry/housekeeping?: Yes  Manage your money, pay your bills and track your expenses?: Yes  Make your own meals?: Yes    Do your own shopping?: Yes    Previous Hospitalizations:   Any hospitalizations or ED visits within the last 12 months?: No      Advance Care Planning:   Living will: No    Durable POA for healthcare: No    Advanced directive: No      PREVENTIVE SCREENINGS      Cardiovascular Screening:    General: Screening Current      Diabetes Screening: General: Screening Current      Cervical Cancer Screening:    General: Screening Not Indicated      Osteoporosis Screening:    General: Screening Current      Lung Cancer Screening:     General: Screening Not Indicated      Juanjose Friday, MD

## 2020-09-08 NOTE — PROGRESS NOTES
Assessment/Plan:    Hypertension  Blood pressure currently well controlled with Dyazide  Continue Dyazide, low-salt diet, exercise  Anxiety  Patient is currently treated with paroxetine 20 mg  It is doing a good job for her  She has a difficult relationship with her  present time and it has been this way for quite a while  Also there is stress with her kids  She is currently estranged from her daughter  She does take care of herself emotionally she has friends she does get out of the house and works part-time  Uterine cancer ([facility] [facility] )  7 years, no recurrence  S/P hysterectomy  Diagnoses and all orders for this visit:    Sunburn  -     hydrocortisone 2 5 % lotion; Apply topically 2 (two) times a day    Malignant neoplasm of corpus uteri [facility])        Subjective:      Patient ID: Rowan Parkinson is a 65 y o  female  HPI    The following portions of the patient's history were reviewed and updated as appropriate: allergies, current medications, past family history, past medical history, past social history, past surgical history and problem list     Review of Systems   Constitutional: Negative for activity change, appetite change, fatigue and fever  HENT: Negative for congestion, ear pain, hearing loss, nosebleeds, postnasal drip, rhinorrhea and trouble swallowing  Eyes: Negative for photophobia, pain, redness and visual disturbance  Respiratory: Negative for cough, chest tightness, shortness of breath and wheezing  Cardiovascular: Negative for chest pain and palpitations  Gastrointestinal: Negative for abdominal pain, blood in stool, constipation, diarrhea, nausea and vomiting  Endocrine: Negative for cold intolerance, heat intolerance, polydipsia, polyphagia and polyuria  Genitourinary: Negative for difficulty urinating, dyspareunia, dysuria, flank pain, frequency, menstrual problem, pelvic pain, urgency, vaginal bleeding and vaginal discharge     Musculoskeletal: Negative for arthralgias, gait problem, joint swelling and myalgias  Skin: Negative for rash and wound  Neurological: Negative for dizziness, tremors, seizures, syncope, speech difficulty, weakness, light-headedness and headaches  Psychiatric/Behavioral: Negative for agitation, decreased concentration, dysphoric mood, sleep disturbance and suicidal ideas  The patient is not nervous/anxious  Objective:      /78 (BP Location: Left arm, Patient Position: Sitting, Cuff Size: Adult)   Pulse 93   Temp 98 3 °F (36 8 °C)   Ht 4' 11 5" (1 511 m)   Wt 73 5 kg (162 lb)   SpO2 93%   BMI 32 17 kg/m²          Physical Exam  Vitals signs and nursing note reviewed  Constitutional:       Appearance: Normal appearance  She is well-developed  HENT:      Head: Normocephalic  Right Ear: Hearing, ear canal and external ear normal       Left Ear: Hearing, tympanic membrane, ear canal and external ear normal       Nose: Nose normal  No mucosal edema or rhinorrhea  Mouth/Throat:      Pharynx: Uvula midline  No oropharyngeal exudate  Eyes:      General: Lids are normal       Conjunctiva/sclera: Conjunctivae normal       Pupils: Pupils are equal, round, and reactive to light  Neck:      Thyroid: No thyroid mass or thyromegaly  Vascular: No carotid bruit  Cardiovascular:      Rate and Rhythm: Normal rate and regular rhythm  Pulses: Normal pulses  Heart sounds: Normal heart sounds, S1 normal and S2 normal  No murmur  No gallop  Pulmonary:      Effort: Pulmonary effort is normal       Breath sounds: Normal breath sounds  No wheezing or rales  Abdominal:      General: Bowel sounds are normal       Palpations: Abdomen is soft  Tenderness: There is no abdominal tenderness  Hernia: No hernia is present  Musculoskeletal: Normal range of motion  Lymphadenopathy:      Cervical: No cervical adenopathy  Skin:     General: Skin is warm and dry  Findings: No rash     Neurological: Mental Status: She is oriented to person, place, and time  Cranial Nerves: No cranial nerve deficit  Sensory: No sensory deficit  Coordination: Coordination normal    Psychiatric:         Speech: Speech normal          Behavior: Behavior normal  Behavior is cooperative  Thought Content:  Thought content normal          Judgment: Judgment normal

## 2020-09-08 NOTE — ASSESSMENT & PLAN NOTE
Patient is currently treated with paroxetine 20 mg  It is doing a good job for her  She has a difficult relationship with her  present time and it has been this way for quite a while  Also there is stress with her kids  She is currently estranged from her daughter  She does take care of herself emotionally she has friends she does get out of the house and works part-time

## 2020-09-08 NOTE — PATIENT INSTRUCTIONS
Medicare Preventive Visit Patient Instructions  Thank you for completing your Welcome to Medicare Visit or Medicare Annual Wellness Visit today  Your next wellness visit will be due in one year (9/8/2021)  The screening/preventive services that you may require over the next 5-10 years are detailed below  Some tests may not apply to you based off risk factors and/or age  Screening tests ordered at today's visit but not completed yet may show as past due  Also, please note that scanned in results may not display below  Preventive Screenings:  Service Recommendations Previous Testing/Comments   Colorectal Cancer Screening  * Colonoscopy    * Fecal Occult Blood Test (FOBT)/Fecal Immunochemical Test (FIT)  * Fecal DNA/Cologuard Test  * Flexible Sigmoidoscopy Age: 54-65 years old   Colonoscopy: every 10 years (may be performed more frequently if at higher risk)  OR  FOBT/FIT: every 1 year  OR  Cologuard: every 3 years  OR  Sigmoidoscopy: every 5 years  Screening may be recommended earlier than age 48 if at higher risk for colorectal cancer  Also, an individualized decision between you and your healthcare provider will decide whether screening between the ages of 74-80 would be appropriate  Colonoscopy: Not on file  FOBT/FIT: Not on file  Cologuard: Not on file  Sigmoidoscopy: Not on file         Breast Cancer Screening Age: 36 years old  Frequency: every 1-2 years  Not required if history of left and right mastectomy Mammogram: 03/15/2018       Cervical Cancer Screening Between the ages of 21-29, pap smear recommended once every 3 years  Between the ages of 33-67, can perform pap smear with HPV co-testing every 5 years     Recommendations may differ for women with a history of total hysterectomy, cervical cancer, or abnormal pap smears in past  Pap Smear: Not on file    Screening Not Indicated   Hepatitis C Screening Once for adults born between 1945 and 1965  More frequently in patients at high risk for Hepatitis C Hep C Antibody: Not on file       Diabetes Screening 1-2 times per year if you're at risk for diabetes or have pre-diabetes Fasting glucose: 96 mg/dL   A1C: No results in last 5 years    Screening Current   Cholesterol Screening Once every 5 years if you don't have a lipid disorder  May order more often based on risk factors  Lipid panel: 03/02/2020    Screening Current     Other Preventive Screenings Covered by Medicare:  1  Abdominal Aortic Aneurysm (AAA) Screening: covered once if your at risk  You're considered to be at risk if you have a family history of AAA  2  Lung Cancer Screening: covers low dose CT scan once per year if you meet all of the following conditions: (1) Age 50-69; (2) No signs or symptoms of lung cancer; (3) Current smoker or have quit smoking within the last 15 years; (4) You have a tobacco smoking history of at least 30 pack years (packs per day multiplied by number of years you smoked); (5) You get a written order from a healthcare provider  3  Glaucoma Screening: covered annually if you're considered high risk: (1) You have diabetes OR (2) Family history of glaucoma OR (3)  aged 48 and older OR (3)  American aged 72 and older  3  Osteoporosis Screening: covered every 2 years if you meet one of the following conditions: (1) You're estrogen deficient and at risk for osteoporosis based off medical history and other findings; (2) Have a vertebral abnormality; (3) On glucocorticoid therapy for more than 3 months; (4) Have primary hyperparathyroidism; (5) On osteoporosis medications and need to assess response to drug therapy  · Last bone density test (DXA Scan): 04/30/2018  5  HIV Screening: covered annually if you're between the age of 12-76  Also covered annually if you are younger than 13 and older than 72 with risk factors for HIV infection  For pregnant patients, it is covered up to 3 times per pregnancy      Immunizations:  Immunization Recommendations Influenza Vaccine Annual influenza vaccination during flu season is recommended for all persons aged >= 6 months who do not have contraindications   Pneumococcal Vaccine (Prevnar and Pneumovax)  * Prevnar = PCV13  * Pneumovax = PPSV23   Adults 25-60 years old: 1-3 doses may be recommended based on certain risk factors  Adults 72 years old: Prevnar (PCV13) vaccine recommended followed by Pneumovax (PPSV23) vaccine  If already received PPSV23 since turning 65, then PCV13 recommended at least one year after PPSV23 dose  Hepatitis B Vaccine 3 dose series if at intermediate or high risk (ex: diabetes, end stage renal disease, liver disease)   Tetanus (Td) Vaccine - COST NOT COVERED BY MEDICARE PART B Following completion of primary series, a booster dose should be given every 10 years to maintain immunity against tetanus  Td may also be given as tetanus wound prophylaxis  Tdap Vaccine - COST NOT COVERED BY MEDICARE PART B Recommended at least once for all adults  For pregnant patients, recommended with each pregnancy  Shingles Vaccine (Shingrix) - COST NOT COVERED BY MEDICARE PART B  2 shot series recommended in those aged 48 and above     Health Maintenance Due:      Topic Date Due    Hepatitis C Screening  1950    MAMMOGRAM  03/15/2020    DXA SCAN  04/30/2023     Immunizations Due:      Topic Date Due    DTaP,Tdap,and Td Vaccines (1 - Tdap) 09/02/1969    Influenza Vaccine  07/01/2020     Advance Directives   What are advance directives? Advance directives are legal documents that state your wishes and plans for medical care  These plans are made ahead of time in case you lose your ability to make decisions for yourself  Advance directives can apply to any medical decision, such as the treatments you want, and if you want to donate organs  What are the types of advance directives? There are many types of advance directives, and each state has rules about how to use them   You may choose a combination of any of the following:  · Living will: This is a written record of the treatment you want  You can also choose which treatments you do not want, which to limit, and which to stop at a certain time  This includes surgery, medicine, IV fluid, and tube feedings  · Durable power of  for healthcare [facility]): This is a written record that states who you want to make healthcare choices for you when you are unable to make them for yourself  This person, called a proxy, is usually a family member or a friend  You may choose more than 1 proxy  · Do not resuscitate (DNR) order:  A DNR order is used in case your heart stops beating or you stop breathing  It is a request not to have certain forms of treatment, such as CPR  A DNR order may be included in other types of advance directives  · Medical directive: This covers the care that you want if you are in a coma, near death, or unable to make decisions for yourself  You can list the treatments you want for each condition  Treatment may include pain medicine, surgery, blood transfusions, dialysis, IV or tube feedings, and a ventilator (breathing machine)  · Values history: This document has questions about your views, beliefs, and how you feel and think about life  This information can help others choose the care that you would choose  Why are advance directives important? An advance directive helps you control your care  Although spoken wishes may be used, it is better to have your wishes written down  Spoken wishes can be misunderstood, or not followed  Treatments may be given even if you do not want them  An advance directive may make it easier for your family to make difficult choices about your care  Weight Management   Why it is important to manage your weight:  Being overweight increases your risk of health conditions such as heart disease, high blood pressure, type 2 diabetes, and certain types of cancer   It can also increase your risk for osteoarthritis, sleep apnea, and other respiratory problems  Aim for a slow, steady weight loss  Even a small amount of weight loss can lower your risk of health problems  How to lose weight safely:  A safe and healthy way to lose weight is to eat fewer calories and get regular exercise  You can lose up about 1 pound a week by decreasing the number of calories you eat by 500 calories each day  Healthy meal plan for weight management:  A healthy meal plan includes a variety of foods, contains fewer calories, and helps you stay healthy  A healthy meal plan includes the following:  · Eat whole-grain foods more often  A healthy meal plan should contain fiber  Fiber is the part of grains, fruits, and vegetables that is not broken down by your body  Whole-grain foods are healthy and provide extra fiber in your diet  Some examples of whole-grain foods are whole-wheat breads and pastas, oatmeal, brown rice, and bulgur  · Eat a variety of vegetables every day  Include dark, leafy greens such as spinach, kale, bolivar greens, and mustard greens  Eat yellow and orange vegetables such as carrots, sweet potatoes, and winter squash  · Eat a variety of fruits every day  Choose fresh or canned fruit (canned in its own juice or light syrup) instead of juice  Fruit juice has very little or no fiber  · Eat low-fat dairy foods  Drink fat-free (skim) milk or 1% milk  Eat fat-free yogurt and low-fat cottage cheese  Try low-fat cheeses such as mozzarella and other reduced-fat cheeses  · Choose meat and other protein foods that are low in fat  Choose beans or other legumes such as split peas or lentils  Choose fish, skinless poultry (chicken or turkey), or lean cuts of red meat (beef or pork)  Before you cook meat or poultry, cut off any visible fat  · Use less fat and oil  Try baking foods instead of frying them  Add less fat, such as margarine, sour cream, regular salad dressing and mayonnaise to foods   Eat fewer high-fat foods  Some examples of high-fat foods include french fries, doughnuts, ice cream, and cakes  · Eat fewer sweets  Limit foods and drinks that are high in sugar  This includes candy, cookies, regular soda, and sweetened drinks  Exercise:  Exercise at least 30 minutes per day on most days of the week  Some examples of exercise include walking, biking, dancing, and swimming  You can also fit in more physical activity by taking the stairs instead of the elevator or parking farther away from stores  Ask your healthcare provider about the best exercise plan for you  © Copyright [employer] 2018 Information is for End User's use only and may not be sold, redistributed or otherwise used for commercial purposes   All illustrations and images included in CareNotes® are the copyrighted property of A D A M , Inc  or [address] [place] [address]

## 2020-12-01 ENCOUNTER — TELEPHONE (OUTPATIENT)
Dept: ADMINISTRATIVE | Facility: OTHER | Age: 70
End: 2020-12-01

## 2020-12-03 ENCOUNTER — TELEPHONE (OUTPATIENT)
Dept: FAMILY MEDICINE CLINIC | Facility: MEDICAL CENTER | Age: 70
End: 2020-12-03

## 2020-12-03 NOTE — TELEPHONE ENCOUNTER
Cancer registry form received via mail for Dr Teresita Gant to complete for pt and return to them in envelope provided    Form and envelope are in Dr Garnica Shows

## 2020-12-19 DIAGNOSIS — F41.1 GAD (GENERALIZED ANXIETY DISORDER): ICD-10-CM

## 2020-12-20 RX ORDER — PAROXETINE HYDROCHLORIDE 20 MG/1
TABLET, FILM COATED ORAL
Qty: 90 TABLET | Refills: 0 | Status: SHIPPED | OUTPATIENT
Start: 2020-12-20 | End: 2021-04-16

## 2021-01-20 ENCOUNTER — IMMUNIZATIONS (OUTPATIENT)
Dept: FAMILY MEDICINE CLINIC | Facility: HOSPITAL | Age: 71
End: 2021-01-20

## 2021-01-20 DIAGNOSIS — Z23 ENCOUNTER FOR IMMUNIZATION: Primary | ICD-10-CM

## 2021-01-20 PROCEDURE — 0001A SARS-COV-2 / COVID-19 MRNA VACCINE (PFIZER-BIONTECH) 30 MCG: CPT

## 2021-01-20 PROCEDURE — 91300 SARS-COV-2 / COVID-19 MRNA VACCINE (PFIZER-BIONTECH) 30 MCG: CPT

## 2021-02-10 ENCOUNTER — IMMUNIZATIONS (OUTPATIENT)
Dept: FAMILY MEDICINE CLINIC | Facility: HOSPITAL | Age: 71
End: 2021-02-10

## 2021-02-10 DIAGNOSIS — Z23 ENCOUNTER FOR IMMUNIZATION: Primary | ICD-10-CM

## 2021-02-10 PROCEDURE — 0002A SARS-COV-2 / COVID-19 MRNA VACCINE (PFIZER-BIONTECH) 30 MCG: CPT

## 2021-02-10 PROCEDURE — 91300 SARS-COV-2 / COVID-19 MRNA VACCINE (PFIZER-BIONTECH) 30 MCG: CPT

## 2021-03-18 ENCOUNTER — OFFICE VISIT (OUTPATIENT)
Dept: URGENT CARE | Facility: MEDICAL CENTER | Age: 71
End: 2021-03-18
Payer: MEDICARE

## 2021-03-18 VITALS
WEIGHT: 151 LBS | RESPIRATION RATE: 18 BRPM | SYSTOLIC BLOOD PRESSURE: 148 MMHG | HEIGHT: 60 IN | DIASTOLIC BLOOD PRESSURE: 75 MMHG | HEART RATE: 62 BPM | TEMPERATURE: 97.4 F | OXYGEN SATURATION: 95 % | BODY MASS INDEX: 29.64 KG/M2

## 2021-03-18 DIAGNOSIS — R30.0 DYSURIA: Primary | ICD-10-CM

## 2021-03-18 LAB
SL AMB  POCT GLUCOSE, UA: NORMAL
SL AMB LEUKOCYTE ESTERASE,UA: NORMAL
SL AMB POCT BILIRUBIN,UA: NORMAL
SL AMB POCT BLOOD,UA: NORMAL
SL AMB POCT CLARITY,UA: NORMAL
SL AMB POCT COLOR,UA: YELLOW
SL AMB POCT KETONES,UA: NORMAL
SL AMB POCT NITRITE,UA: NORMAL
SL AMB POCT PH,UA: 5
SL AMB POCT SPECIFIC GRAVITY,UA: 1015
SL AMB POCT URINE PROTEIN: NORMAL
SL AMB POCT UROBILINOGEN: 0.2

## 2021-03-18 PROCEDURE — 81002 URINALYSIS NONAUTO W/O SCOPE: CPT | Performed by: PHYSICIAN ASSISTANT

## 2021-03-18 PROCEDURE — G0463 HOSPITAL OUTPT CLINIC VISIT: HCPCS | Performed by: PHYSICIAN ASSISTANT

## 2021-03-18 PROCEDURE — 99213 OFFICE O/P EST LOW 20 MIN: CPT | Performed by: PHYSICIAN ASSISTANT

## 2021-03-18 NOTE — PATIENT INSTRUCTIONS
1  Increase fluids  2  Tylenol as needed for discomfort  3   Follow up with PCP in 3-5 days if symptoms persist

## 2021-03-18 NOTE — PROGRESS NOTES
[facility]        NAME: Mee Hull is a 65 y o  female  : 1950    MRN: [id number]  DATE: 2021  TIME: 12:27 PM    Assessment and Plan   Dysuria [R30 0]  1  Dysuria  POCT urine dip         Patient Instructions     1  Increase fluids  2  Tylenol as needed for discomfort  3  Follow up with PCP in 3-5 days if symptoms persist       Chief Complaint     Chief Complaint   Patient presents with    Possible UTI     Started few days ago with frequent urination  History of Present Illness       Gabe Thomson   Is a 77-year-old female presents with a one-week history of intermittent episodes of increasing urinary frequency and lower abdominal discomfort  Patient denies any fever, pain with urination or visible blood in her urine  She reports increasing urinary frequency over the past week  Patient reports she took a AZO over-the-counter with some improvement of symptoms but her symptoms worsened over the past 2 days  Review of Systems   Review of Systems   Constitutional: Negative  HENT: Negative  Respiratory: Negative  Cardiovascular: Negative  Gastrointestinal: Negative  Genitourinary: Positive for frequency           Current Medications       Current Outpatient Medications:     Calcium Carbonate-Vitamin D 600-200 MG-UNIT CAPS, Take 1 tablet by mouth, Disp: , Rfl:     LORazepam (ATIVAN) 0 5 mg tablet, Take 1 tablet (0 5 mg total) by mouth every 8 (eight) hours as needed for anxiety, Disp: 15 tablet, Rfl: 0    Multiple Vitamins-Minerals (MULTIVITAMIN ADULT EXTRA C PO), Take 1 tablet by mouth daily, Disp: , Rfl:     PARoxetine (PAXIL) 20 mg tablet, Take 1 tablet by mouth once daily, Disp: 90 tablet, Rfl: 0    triamterene-hydrochlorothiazide (MAXZIDE-25) 37 5-25 mg per tablet, Take 1 tablet by mouth once daily, Disp: 90 tablet, Rfl: 3    hydrocortisone 2 5 % lotion, Apply topically 2 (two) times a day (Patient not taking: Reported on 3/18/2021), Disp: 118 mL, Rfl: 0    Current Allergies     Allergies as of 03/18/2021 - Reviewed 03/18/2021   Allergen Reaction Noted    Erythromycin base Other (See Comments) 08/28/2013    Penicillins Other (See Comments) 04/20/2012            The following portions of the patient's history were reviewed and updated as appropriate: allergies, current medications, past family history, past medical history, past social history, past surgical history and problem list      Past Medical History:   Diagnosis Date    Anxiety     Cancer ([address] )     Uterine Ca and Skin    Hypertension        Past Surgical History:   Procedure Laterality Date    ABDOMINAL HYSTERECTOMY      with removal of both ovaries     HYSTERECTOMY         Family History   Problem Relation Age of Onset    Diabetes Mother     Heart disease Mother     Stroke Mother     Kidney cancer Brother          Medications have been verified  Objective   /75   Pulse 62   Temp (!) 97 4 °F (36 3 °C) (Temporal)   Resp 18   Ht 5' (1 524 m)   Wt 68 5 kg (151 lb)   SpO2 95%   BMI 29 49 kg/m²   No LMP recorded  Physical Exam     Physical Exam  Constitutional:       General: She is not in acute distress  Appearance: Normal appearance  Cardiovascular:      Rate and Rhythm: Normal rate and regular rhythm  Heart sounds: Normal heart sounds  No murmur  Pulmonary:      Effort: Pulmonary effort is normal       Breath sounds: Normal breath sounds  Abdominal:      General: Abdomen is flat  Bowel sounds are normal       Palpations: Abdomen is soft  Tenderness: There is no abdominal tenderness  There is no right CVA tenderness or left CVA tenderness  Neurological:      Mental Status: She is alert

## 2021-04-15 DIAGNOSIS — F41.1 GAD (GENERALIZED ANXIETY DISORDER): ICD-10-CM

## 2021-04-16 RX ORDER — PAROXETINE HYDROCHLORIDE 20 MG/1
TABLET, FILM COATED ORAL
Qty: 90 TABLET | Refills: 0 | Status: SHIPPED | OUTPATIENT
Start: 2021-04-16 | End: 2021-08-18

## 2021-04-23 ENCOUNTER — TRANSCRIBE ORDERS (OUTPATIENT)
Dept: ADMINISTRATIVE | Facility: HOSPITAL | Age: 71
End: 2021-04-23

## 2021-04-23 DIAGNOSIS — R31.1 BENIGN ESSENTIAL MICROSCOPIC HEMATURIA: Primary | ICD-10-CM

## 2021-05-29 ENCOUNTER — OFFICE VISIT (OUTPATIENT)
Dept: URGENT CARE | Facility: MEDICAL CENTER | Age: 71
End: 2021-05-29
Payer: MEDICARE

## 2021-05-29 VITALS
DIASTOLIC BLOOD PRESSURE: 72 MMHG | TEMPERATURE: 97.6 F | WEIGHT: 153 LBS | RESPIRATION RATE: 18 BRPM | BODY MASS INDEX: 30.04 KG/M2 | HEART RATE: 84 BPM | SYSTOLIC BLOOD PRESSURE: 120 MMHG | OXYGEN SATURATION: 99 % | HEIGHT: 60 IN

## 2021-05-29 DIAGNOSIS — T22.211A PARTIAL THICKNESS BURN OF RIGHT FOREARM, INITIAL ENCOUNTER: Primary | ICD-10-CM

## 2021-05-29 PROCEDURE — 99213 OFFICE O/P EST LOW 20 MIN: CPT | Performed by: PHYSICIAN ASSISTANT

## 2021-05-29 PROCEDURE — G0463 HOSPITAL OUTPT CLINIC VISIT: HCPCS | Performed by: PHYSICIAN ASSISTANT
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
## 2021-05-29 NOTE — PATIENT INSTRUCTIONS
Second-Degree Burn   WHAT YOU NEED TO KNOW:   A second-degree burn is also called a partial-thickness burn  A second-degree burn occurs when the first layer and some of the second layer of skin are burned  A superficial second-degree burn usually heals within 2 to 3 weeks with some scarring  A deep second-degree burn can take longer to heal  A second-degree burn can also get worse after a few days and become a third-degree burn  DISCHARGE INSTRUCTIONS:   Return to the emergency department if:   · You have a fast heartbeat or breathing  · You are not urinating  Call your doctor or burn specialist if:   · You have a fever  · You have increased redness, numbness, or swelling in the burn area  · Your wound or bandage is leaking pus and has a bad smell  · Your pain does not get better, or gets worse, even after you take pain medicine  · You have a dry mouth or eyes  · You are overly thirsty or tired  · You have dark yellow urine or urinate less than usual     · You have a headache or feel dizzy  · You have questions or concerns about your condition or care  Medicines:   · Medicines  may be given to decrease pain, prevent infection, or help your burn heal  They may be given as a pill or as an ointment applied to your skin  · Take your medicine as directed  Contact your healthcare provider if you think your medicine is not helping or if you have side effects  Tell him or her if you are allergic to any medicine  Keep a list of the medicines, vitamins, and herbs you take  Include the amounts, and when and why you take them  Bring the list or the pill bottles to follow-up visits  Carry your medicine list with you in case of an emergency  Burn care:   · Wash your hands with soap and water  Dry your hands with a clean towel or paper towel  · Remove old bandages  You may need to soak the bandage in water before you remove it so it will not stick to your wound      · Gently clean the burned area daily with mild soap and water  Pat the area dry  Look for any swelling or redness around the burn  Do not break closed blisters  You may cause a skin infection  · Apply cream or ointment to the burn with a cotton swab  Place a nonstick bandage over your burn  · Wrap a layer of gauze around the bandage to hold it in place  The wrap should be snug but not tight  It is too tight if you feel tingling or lose feeling in that area  · Apply gentle pressure for a few minutes if bleeding occurs  · Elevate your burned arm or leg above the level of your heart as often as you can  This will help decrease swelling and pain  Prop your burned arm or leg on pillows or blankets to keep it elevated comfortably  Self-care:   · Drink liquids as directed  You may need to drink extra liquid to help prevent dehydration  Ask how much liquid to drink each day and which liquids are best for you  · Go to physical therapy, if directed  Your muscles and joints may not work well after a second-degree burn  A physical therapist teaches you exercises to help improve movement and strength, and to decrease pain  Prevent second-degree burns:   · Do not leave cups, mugs, or bowls containing hot liquids at the edge of a table  Keep pot handles turned away from the stove front  · Do not leave a lit cigarette  Make sure it is no longer lit  Then dispose of it safely  · Store dangerous items out of the reach of children  Store cigarette lighters, matches, and chemicals where children cannot reach them  Use child safety latches on the door of the safe storage area  · Keep your water heater setting to low or medium  (90°F to 120°F, or 32°C to 48°C)  · Wear sunscreen that has a sun protectant factor (SPF) of 15 or higher  The sunscreen should also have ultraviolet A (UVA) and ultraviolet B (UVB) protection  Follow the directions on the label when you use sunscreen   Put on more sunscreen if you are in the sun for more than an hour  Reapply sunscreen often if you go swimming or are sweating  Follow up with your doctor or burn specialist as directed: You may need to return to have your wound checked and your bandage changed  Write down your questions so you remember to ask them during your visits  © Copyright [address] Information is for End User's use only and may not be sold, redistributed or otherwise used for commercial purposes  All illustrations and images included in CareNotes® are the copyrighted property of [employer]  or [facility]   The above information is an  only  It is not intended as medical advice for individual conditions or treatments  Talk to your doctor, nurse or pharmacist before following any medical regimen to see if it is safe and effective for you

## 2021-07-02 ENCOUNTER — TELEPHONE (OUTPATIENT)
Dept: FAMILY MEDICINE CLINIC | Facility: MEDICAL CENTER | Age: 71
End: 2021-07-02

## 2021-07-02 NOTE — TELEPHONE ENCOUNTER
Pt would like to have b/w done for her Sept appt  She will be in next week for a nurse visit and can  then  Thank you

## 2021-07-12 ENCOUNTER — CLINICAL SUPPORT (OUTPATIENT)
Dept: FAMILY MEDICINE CLINIC | Facility: MEDICAL CENTER | Age: 71
End: 2021-07-12
Payer: MEDICARE

## 2021-07-12 DIAGNOSIS — Z11.1 PPD SCREENING TEST: Primary | ICD-10-CM

## 2021-07-12 PROCEDURE — 86580 TB INTRADERMAL TEST: CPT

## 2021-07-14 ENCOUNTER — CLINICAL SUPPORT (OUTPATIENT)
Dept: FAMILY MEDICINE CLINIC | Facility: MEDICAL CENTER | Age: 71
End: 2021-07-14

## 2021-07-14 DIAGNOSIS — Z11.1 SCREENING FOR TUBERCULOSIS: Primary | ICD-10-CM

## 2021-07-14 LAB
INDURATION: 0 MM
TB SKIN TEST: NEGATIVE

## 2021-09-22 ENCOUNTER — APPOINTMENT (OUTPATIENT)
Dept: RADIOLOGY | Facility: MEDICAL CENTER | Age: 71
End: 2021-09-22
Payer: MEDICARE

## 2021-09-22 ENCOUNTER — OFFICE VISIT (OUTPATIENT)
Dept: URGENT CARE | Facility: MEDICAL CENTER | Age: 71
End: 2021-09-22
Payer: MEDICARE

## 2021-09-22 VITALS
OXYGEN SATURATION: 98 % | SYSTOLIC BLOOD PRESSURE: 136 MMHG | DIASTOLIC BLOOD PRESSURE: 65 MMHG | RESPIRATION RATE: 14 BRPM | HEART RATE: 80 BPM | TEMPERATURE: 97.8 F | BODY MASS INDEX: 30.54 KG/M2 | WEIGHT: 156.38 LBS

## 2021-09-22 DIAGNOSIS — M25.562 ACUTE PAIN OF LEFT KNEE: ICD-10-CM

## 2021-09-22 DIAGNOSIS — M25.562 ACUTE PAIN OF LEFT KNEE: Primary | ICD-10-CM

## 2021-09-22 PROCEDURE — 73564 X-RAY EXAM KNEE 4 OR MORE: CPT

## 2021-09-22 PROCEDURE — G0463 HOSPITAL OUTPT CLINIC VISIT: HCPCS | Performed by: PHYSICIAN ASSISTANT

## 2021-09-22 PROCEDURE — 99213 OFFICE O/P EST LOW 20 MIN: CPT | Performed by: PHYSICIAN ASSISTANT

## 2021-09-22 NOTE — PROGRESS NOTES
[facility]        NAME: Ander Aguilar is a 66 y o  female  : 1950    MRN: [id number]  DATE: 2021  TIME: 2:11 PM    Assessment and Plan   Acute pain of left knee [M25 562]  1  Acute pain of left knee  XR knee 4+ vw left injury         Patient Instructions     Knee effusion  Rest, ice, elevate  Follow up with PCP in 3-5 days  Proceed to  ER if symptoms worsen  Chief Complaint     Chief Complaint   Patient presents with    Knee Injury     left knee issues, wears support  Climbing hills 2 weeks ago, noted lump on lateral area of left knee 1 week ago  No pain  Pulling sensation behind knee  History of Present Illness       67 y/o female presents c/o left knee pain  Patient states she has been walking a lot more than usual and the knee swelled up  Denies fall, fever, chills,       Review of Systems   Review of Systems   Constitutional: Negative  HENT: Negative  Eyes: Negative  Respiratory: Negative  Negative for cough, chest tightness, shortness of breath, wheezing and stridor  Cardiovascular: Negative  Negative for chest pain, palpitations and leg swelling  Musculoskeletal: Positive for arthralgias           Current Medications       Current Outpatient Medications:     Calcium Carbonate-Vitamin D 600-200 MG-UNIT CAPS, Take 1 tablet by mouth, Disp: , Rfl:     LORazepam (ATIVAN) 0 5 mg tablet, Take 1 tablet (0 5 mg total) by mouth every 8 (eight) hours as needed for anxiety, Disp: 15 tablet, Rfl: 0    Multiple Vitamins-Minerals (MULTIVITAMIN ADULT EXTRA C PO), Take 1 tablet by mouth daily, Disp: , Rfl:     PARoxetine (PAXIL) 20 mg tablet, Take 1 tablet by mouth once daily, Disp: 90 tablet, Rfl: 0    triamterene-hydrochlorothiazide (MAXZIDE-25) 37 5-25 mg per tablet, Take 1 tablet by mouth once daily, Disp: 90 tablet, Rfl: 3    hydrocortisone 2 5 % lotion, Apply topically 2 (two) times a day (Patient not taking: Reported on 3/18/2021), Disp: 118 mL, Rfl: 0    Current Allergies     Allergies as of 09/22/2021 - Reviewed 09/22/2021   Allergen Reaction Noted    Erythromycin base Other (See Comments) 08/28/2013    Penicillins Other (See Comments) 04/20/2012            The following portions of the patient's history were reviewed and updated as appropriate: allergies, current medications, past family history, past medical history, past social history, past surgical history and problem list      Past Medical History:   Diagnosis Date    Anxiety     Cancer ([address] )     Uterine Ca and Skin    Hypertension        Past Surgical History:   Procedure Laterality Date    ABDOMINAL HYSTERECTOMY      with removal of both ovaries     HYSTERECTOMY         Family History   Problem Relation Age of Onset    Diabetes Mother     Heart disease Mother     Stroke Mother     Kidney cancer Brother          Medications have been verified  Objective   /65   Pulse 80   Temp 97 8 °F (36 6 °C)   Resp 14   Wt 70 9 kg (156 lb 6 oz)   SpO2 98%   BMI 30 54 kg/m²        Physical Exam     Physical Exam  Constitutional:       General: She is not in acute distress  Appearance: She is well-developed  She is not diaphoretic  HENT:      Head: Normocephalic and atraumatic  Right Ear: Hearing and external ear normal       Left Ear: Hearing and external ear normal       Mouth/Throat:      Pharynx: Uvula midline  Eyes:      Conjunctiva/sclera: Conjunctivae normal       Pupils: Pupils are equal, round, and reactive to light  Cardiovascular:      Rate and Rhythm: Normal rate and regular rhythm  Heart sounds: Normal heart sounds  Pulmonary:      Effort: Pulmonary effort is normal       Breath sounds: Normal breath sounds  Musculoskeletal:      Cervical back: Normal range of motion and neck supple  Right knee: Normal       Left knee: Swelling and effusion present  No deformity, erythema, ecchymosis or lacerations  Decreased range of motion   Tenderness present over the lateral joint line  No medial joint line tenderness  No LCL laxity, MCL laxity, ACL laxity or PCL laxity  Normal pulse  Legs:    Lymphadenopathy:      Cervical: No cervical adenopathy

## 2021-09-22 NOTE — PATIENT INSTRUCTIONS
Knee effusion  Rest, ice, elevate  Follow up with PCP in 3-5 days  Proceed to  ER if symptoms worsen  Swollen Knee Joint   WHAT YOU NEED TO KNOW:   A swollen knee joint may be caused by arthritis or by an injury or trauma, such as a knee sprain  It may also happen if you exercise too much  It may be painful to bend or straighten your knee, or walk  DISCHARGE INSTRUCTIONS:   Return to the emergency department if:   · Your knee locks or gives way and you fall  · Your feet or toes start to look pale or feel cold  · You cannot bear weight on your leg, or you have severe pain even after treatment  Contact your healthcare provider if:   · You have a fever  · You have redness or warmth over your knee  · The swelling does not decrease with treatment  · It gets harder or more painful to straighten your leg at the knee  · Your knee weakens, or you continue to limp  · You have questions or concerns about your condition or care  Medicines:   · NSAIDs , such as ibuprofen, help decrease swelling, pain, and fever  This medicine is available with or without a doctor's order  NSAIDs can cause stomach bleeding or kidney problems in certain people  If you take blood thinner medicine, always ask your healthcare provider if NSAIDs are safe for you  Always read the medicine label and follow directions  · Take your medicine as directed  Contact your healthcare provider if you think your medicine is not helping or if you have side effects  Tell him of her if you are allergic to any medicine  Keep a list of the medicines, vitamins, and herbs you take  Include the amounts, and when and why you take them  Bring the list or the pill bottles to follow-up visits  Carry your medicine list with you in case of an emergency  What you can do to manage your symptoms:   · Rest your knee  Avoid activities that make the swelling or pain worse  You may need to avoid putting weight on your knee while you have pain  Crutches, a cane, or a walker can be used to avoid putting weight on your knee while it heals  · Apply ice to your knee to help relieve pain and swelling  Apply ice for 15 to 20 minutes every hour or as directed  Use an ice pack, or put crushed ice in a plastic bag  Cover it with a towel before you apply it to your knee  Ice helps prevent tissue damage and decreases swelling and pain  · Compress your knee with a brace or bandage to help reduce swelling  Use a brace or bandage only as directed  · Elevate your knee above the level of your heart as often as you can  This will help decrease swelling and pain  Prop your joint on pillows or blankets to keep it elevated comfortably  · Apply heat to your knee to relieve pain  Apply heat for 20 to 30 minutes every 2 hours for as many days as directed  Heat helps decrease pain  · Go to physical therapy if directed  A physical therapist teaches you exercises to help improve movement and strength, and to decrease pain  Follow up with your healthcare provider as directed:  Write down your questions so you remember to ask them during your visits  © Copyright [employer] 2021 Information is for End User's use only and may not be sold, redistributed or otherwise used for commercial purposes  All illustrations and images included in CareNotes® are the copyrighted property of [employer] , Inc  or Tanesha Vasquez  The above information is an  only  It is not intended as medical advice for individual conditions or treatments  Talk to your doctor, nurse or pharmacist before following any medical regimen to see if it is safe and effective for you

## 2021-10-06 ENCOUNTER — APPOINTMENT (OUTPATIENT)
Dept: LAB | Facility: CLINIC | Age: 71
End: 2021-10-06
Payer: MEDICARE

## 2021-10-06 DIAGNOSIS — I10 HYPERTENSION, UNSPECIFIED TYPE: ICD-10-CM

## 2021-10-06 DIAGNOSIS — Z13.220 SCREENING FOR LIPID DISORDERS: ICD-10-CM

## 2021-10-06 DIAGNOSIS — Z13.29 SCREENING FOR THYROID DISORDER: ICD-10-CM

## 2021-10-06 LAB
ANION GAP SERPL CALCULATED.3IONS-SCNC: 3 MMOL/L (ref 4–13)
BUN SERPL-MCNC: 14 MG/DL (ref 5–25)
CALCIUM SERPL-MCNC: 9 MG/DL (ref 8.3–10.1)
CHLORIDE SERPL-SCNC: 108 MMOL/L (ref 100–108)
CHOLEST SERPL-MCNC: 202 MG/DL (ref 50–200)
CO2 SERPL-SCNC: 29 MMOL/L (ref 21–32)
CREAT SERPL-MCNC: 0.76 MG/DL (ref 0.6–1.3)
GFR SERPL CREATININE-BSD FRML MDRD: 78 ML/MIN/1.73SQ M
GLUCOSE P FAST SERPL-MCNC: 92 MG/DL (ref 65–99)
HDLC SERPL-MCNC: 64 MG/DL
LDLC SERPL CALC-MCNC: 112 MG/DL (ref 0–100)
POTASSIUM SERPL-SCNC: 3.8 MMOL/L (ref 3.5–5.3)
SODIUM SERPL-SCNC: 140 MMOL/L (ref 136–145)
TRIGL SERPL-MCNC: 132 MG/DL
TSH SERPL DL<=0.05 MIU/L-ACNC: 2.29 UIU/ML (ref 0.36–3.74)

## 2021-10-06 PROCEDURE — 36415 COLL VENOUS BLD VENIPUNCTURE: CPT

## 2021-10-06 PROCEDURE — 80048 BASIC METABOLIC PNL TOTAL CA: CPT

## 2021-10-06 PROCEDURE — 84443 ASSAY THYROID STIM HORMONE: CPT

## 2021-10-06 PROCEDURE — 80061 LIPID PANEL: CPT

## 2021-10-08 DIAGNOSIS — I10 ESSENTIAL HYPERTENSION: ICD-10-CM

## 2021-10-08 RX ORDER — TRIAMTERENE AND HYDROCHLOROTHIAZIDE 37.5; 25 MG/1; MG/1
TABLET ORAL
Qty: 90 TABLET | Refills: 0 | Status: SHIPPED | OUTPATIENT
Start: 2021-10-08 | End: 2022-02-03

## 2021-10-15 ENCOUNTER — OFFICE VISIT (OUTPATIENT)
Dept: FAMILY MEDICINE CLINIC | Facility: MEDICAL CENTER | Age: 71
End: 2021-10-15
Payer: MEDICARE

## 2021-10-15 VITALS
HEIGHT: 60 IN | HEART RATE: 78 BPM | DIASTOLIC BLOOD PRESSURE: 82 MMHG | WEIGHT: 156 LBS | SYSTOLIC BLOOD PRESSURE: 120 MMHG | BODY MASS INDEX: 30.63 KG/M2 | TEMPERATURE: 99.1 F

## 2021-10-15 DIAGNOSIS — I10 HYPERTENSION, UNSPECIFIED TYPE: Primary | ICD-10-CM

## 2021-10-15 DIAGNOSIS — F41.9 ANXIETY: ICD-10-CM

## 2021-10-15 DIAGNOSIS — Z12.11 SCREENING FOR MALIGNANT NEOPLASM OF COLON: ICD-10-CM

## 2021-10-15 DIAGNOSIS — C55 MALIGNANT NEOPLASM OF UTERUS, UNSPECIFIED SITE (HCC): ICD-10-CM

## 2021-10-15 DIAGNOSIS — Z12.31 ENCOUNTER FOR SCREENING MAMMOGRAM FOR MALIGNANT NEOPLASM OF BREAST: ICD-10-CM

## 2021-10-15 DIAGNOSIS — E66.09 CLASS 1 OBESITY DUE TO EXCESS CALORIES WITHOUT SERIOUS COMORBIDITY WITH BODY MASS INDEX (BMI) OF 30.0 TO 30.9 IN ADULT: ICD-10-CM

## 2021-10-15 PROBLEM — E66.811 CLASS 1 OBESITY DUE TO EXCESS CALORIES WITHOUT SERIOUS COMORBIDITY WITH BODY MASS INDEX (BMI) OF 30.0 TO 30.9 IN ADULT: Status: ACTIVE | Noted: 2021-10-15

## 2021-10-15 PROCEDURE — G0439 PPPS, SUBSEQ VISIT: HCPCS | Performed by: FAMILY MEDICINE

## 2021-10-15 PROCEDURE — 1123F ACP DISCUSS/DSCN MKR DOCD: CPT | Performed by: FAMILY MEDICINE

## 2021-10-15 PROCEDURE — 99214 OFFICE O/P EST MOD 30 MIN: CPT | Performed by: FAMILY MEDICINE

## 2021-10-15 RX ORDER — LORAZEPAM 0.5 MG/1
0.5 TABLET ORAL EVERY 8 HOURS PRN
Qty: 15 TABLET | Refills: 0 | Status: SHIPPED | OUTPATIENT
Start: 2021-10-15

## 2021-12-08 DIAGNOSIS — F41.1 GAD (GENERALIZED ANXIETY DISORDER): ICD-10-CM

## 2021-12-08 RX ORDER — PAROXETINE HYDROCHLORIDE 20 MG/1
TABLET, FILM COATED ORAL
Qty: 90 TABLET | Refills: 0 | Status: SHIPPED | OUTPATIENT
Start: 2021-12-08 | End: 2022-08-09

## 2022-01-03 ENCOUNTER — OFFICE VISIT (OUTPATIENT)
Dept: URGENT CARE | Facility: MEDICAL CENTER | Age: 72
End: 2022-01-03
Payer: MEDICARE

## 2022-01-03 VITALS
DIASTOLIC BLOOD PRESSURE: 73 MMHG | OXYGEN SATURATION: 96 % | RESPIRATION RATE: 18 BRPM | HEART RATE: 76 BPM | HEIGHT: 60 IN | WEIGHT: 154 LBS | SYSTOLIC BLOOD PRESSURE: 141 MMHG | TEMPERATURE: 98.2 F | BODY MASS INDEX: 30.23 KG/M2

## 2022-01-03 DIAGNOSIS — K04.7 DENTAL ABSCESS: Primary | ICD-10-CM

## 2022-01-03 PROCEDURE — 99213 OFFICE O/P EST LOW 20 MIN: CPT | Performed by: PHYSICIAN ASSISTANT

## 2022-01-03 PROCEDURE — G0463 HOSPITAL OUTPT CLINIC VISIT: HCPCS | Performed by: PHYSICIAN ASSISTANT

## 2022-01-03 RX ORDER — CLINDAMYCIN HYDROCHLORIDE 300 MG/1
300 CAPSULE ORAL 4 TIMES DAILY
Qty: 28 CAPSULE | Refills: 0 | Status: SHIPPED | OUTPATIENT
Start: 2022-01-03 | End: 2022-01-10

## 2022-01-03 NOTE — PROGRESS NOTES
[facility]        NAME: Jonathan Iniguez is a 66 y o  female  : 1950    MRN: [id number]  DATE: January 3, 2022  TIME: 3:48 PM    Assessment and Plan   Dental abscess [K04 7]  1  Dental abscess  clindamycin (CLEOCIN) 300 MG capsule         Patient Instructions     Dental abscess  clindamyacin 4 times a day  Follow up with PCP in 3-5 days  Proceed to  ER if symptoms worsen  Chief Complaint     Chief Complaint   Patient presents with    Dental Pain     Pt with left lower dental pain that began about 3 days          History of Present Illness       67 y/o female presents c/o swelling of face and broken tooth x 2 days  Denies fever, chills, SOB      Review of Systems   Review of Systems   Constitutional: Negative  HENT: Positive for dental problem and facial swelling  Eyes: Negative  Respiratory: Negative  Negative for cough, chest tightness, shortness of breath, wheezing and stridor  Cardiovascular: Negative  Negative for chest pain, palpitations and leg swelling           Current Medications       Current Outpatient Medications:     Ascorbic Acid (VITAMIN C PO), Take by mouth, Disp: , Rfl:     Calcium Carbonate-Vitamin D 600-200 MG-UNIT CAPS, Take 1 tablet by mouth, Disp: , Rfl:     ELDERBERRY PO, Take by mouth, Disp: , Rfl:     LORazepam (ATIVAN) 0 5 mg tablet, Take 1 tablet (0 5 mg total) by mouth every 8 (eight) hours as needed for anxiety, Disp: 15 tablet, Rfl: 0    Multiple Vitamins-Minerals (MULTIVITAMIN ADULT EXTRA C PO), Take 1 tablet by mouth daily, Disp: , Rfl:     PARoxetine (PAXIL) 20 mg tablet, Take 1 tablet by mouth once daily, Disp: 90 tablet, Rfl: 0    triamterene-hydrochlorothiazide (MAXZIDE-25) 37 5-25 mg per tablet, Take 1 tablet by mouth once daily, Disp: 90 tablet, Rfl: 0    clindamycin (CLEOCIN) 300 MG capsule, Take 1 capsule (300 mg total) by mouth 4 (four) times a day for 7 days, Disp: 28 capsule, Rfl: 0    Multiple Vitamins-Minerals (ZINC PO), Take by mouth, Disp: , Rfl:     Current Allergies     Allergies as of 01/03/2022 - Reviewed 01/03/2022   Allergen Reaction Noted    Erythromycin base Other (See Comments) 08/28/2013    Penicillins Other (See Comments) 04/20/2012            The following portions of the patient's history were reviewed and updated as appropriate: allergies, current medications, past family history, past medical history, past social history, past surgical history and problem list      Past Medical History:   Diagnosis Date    Anxiety     Cancer ([address] )     Uterine Ca and Skin    Hypertension        Past Surgical History:   Procedure Laterality Date    ABDOMINAL HYSTERECTOMY      with removal of both ovaries     HYSTERECTOMY         Family History   Problem Relation Age of Onset    Diabetes Mother     Heart disease Mother     Stroke Mother     Kidney cancer Brother          Medications have been verified  Objective   /73   Pulse 76   Temp 98 2 °F (36 8 °C)   Resp 18   Ht 5' (1 524 m)   Wt 69 9 kg (154 lb)   SpO2 96%   BMI 30 08 kg/m²        Physical Exam     Physical Exam  Constitutional:       Appearance: She is well-developed  HENT:      Head: Normocephalic and atraumatic  Right Ear: External ear normal       Left Ear: External ear normal       Nose: Nose normal       Mouth/Throat:      Dentition: Dental tenderness, dental caries and dental abscesses present  Pharynx: No oropharyngeal exudate  Cardiovascular:      Rate and Rhythm: Normal rate and regular rhythm  Heart sounds: Normal heart sounds  Pulmonary:      Effort: Pulmonary effort is normal  No respiratory distress  Breath sounds: Normal breath sounds  No wheezing or rales  Chest:      Chest wall: No tenderness  Musculoskeletal:      Cervical back: Normal range of motion and neck supple  Lymphadenopathy:      Cervical: No cervical adenopathy

## 2022-01-03 NOTE — PATIENT INSTRUCTIONS
Dental abscess  clindamyacin 4 times a day  Follow up with PCP in 3-5 days  Proceed to  ER if symptoms worsen  Dental Abscess   WHAT YOU NEED TO KNOW:   A dental abscess is a collection of pus in or around a tooth  A dental abscess is caused by bacteria  The bacteria can enter the tooth when the enamel (outer part of the tooth) is damaged by tooth decay  Bacteria can also enter the tooth through a chip in the tooth or a cut in the gum  Food particles that are stuck between the teeth for a long time may also lead to an abscess  DISCHARGE INSTRUCTIONS:   Return to the emergency department if:   · You have severe pain in your tooth or jaw  · You have trouble breathing because of pain or swelling  Call your doctor if:   · Your symptoms get worse, even after treatment  · Your mouth is bleeding  · You cannot eat or drink because of pain or swelling  · Your abscess returns  · You have an injury that causes a crack in your tooth  · You have questions or concerns about your condition or care  Medicines: You may  need any of the following:  · Antibiotics  help treat a bacterial infection  · NSAIDs , such as ibuprofen, help decrease swelling, pain, and fever  This medicine is available with or without a doctor's order  NSAIDs can cause stomach bleeding or kidney problems in certain people  If you take blood thinner medicine, always ask your healthcare provider if NSAIDs are safe for you  Always read the medicine label and follow directions  · Acetaminophen  decreases pain and fever  It is available without a doctor's order  Ask how much to take and how often to take it  Follow directions  Read the labels of all other medicines you are using to see if they also contain acetaminophen, or ask your doctor or pharmacist  Acetaminophen can cause liver damage if not taken correctly  Do not use more than 4 grams (4,000 milligrams) total of acetaminophen in one day       · Prescription pain medicine  may be given  Ask your healthcare provider how to take this medicine safely  Some prescription pain medicines contain acetaminophen  Do not take other medicines that contain acetaminophen without talking to your healthcare provider  Too much acetaminophen may cause liver damage  Prescription pain medicine may cause constipation  Ask your healthcare provider how to prevent or treat constipation  · Take your medicine as directed  Contact your healthcare provider if you think your medicine is not helping or if you have side effects  Tell him of her if you are allergic to any medicine  Keep a list of the medicines, vitamins, and herbs you take  Include the amounts, and when and why you take them  Bring the list or the pill bottles to follow-up visits  Carry your medicine list with you in case of an emergency  Self-care:   · Rinse your mouth every 2 hours with salt water  This will help keep the area clean  · Gently brush your teeth twice a day with a soft tooth brush  This will help keep the area clean  · Eat soft foods as directed  Soft foods may cause less pain  Examples include applesauce, yogurt, and cooked pasta  Ask your healthcare provider how long to follow this instruction  · Apply a warm compress to your tooth or gum  Use a cotton ball or gauze soaked in warm water  Remove the compress in 10 minutes or when it becomes cool  Repeat 3 times a day  Prevent another abscess:   · Brush your teeth at least 2 times a day  with fluoride toothpaste  · Use dental floss at least once a day  to clean between your teeth  · Rinse your mouth with water or mouthwash  after meals and snacks  Chew sugarless gum  · Avoid sugary and starchy food that can stick between your teeth  Limit drinks high in sugar, such as soda or fruit juice  · See your dentist every 6 months  for dental cleanings and oral exams  Follow up with your doctor or dentist in 24 hours, or as directed:   Your healthcare provider will need to check your teeth and gums  Write down your questions so you remember to ask them during your visits  © Copyright [employer] 2021 Information is for End User's use only and may not be sold, redistributed or otherwise used for commercial purposes  All illustrations and images included in CareNotes® are the copyrighted property of [employer]  or Tanesha Vasquez  The above information is an  only  It is not intended as medical advice for individual conditions or treatments  Talk to your doctor, nurse or pharmacist before following any medical regimen to see if it is safe and effective for you

## 2022-01-06 ENCOUNTER — TELEPHONE (OUTPATIENT)
Dept: URGENT CARE | Facility: MEDICAL CENTER | Age: 72
End: 2022-01-06

## 2022-01-06 NOTE — TELEPHONE ENCOUNTER
Spoke with patient who reports upset stomach since starting clindamycin  Advised patient to start acidophilus or cultural daily and reduce clindamycin dose to 3 times a day as opposed to 4  Advised follow-up if symptoms persist and consider medication change, patient does have allergies to penicillin

## 2022-02-03 DIAGNOSIS — I10 ESSENTIAL HYPERTENSION: ICD-10-CM

## 2022-02-03 RX ORDER — TRIAMTERENE AND HYDROCHLOROTHIAZIDE 37.5; 25 MG/1; MG/1
TABLET ORAL
Qty: 90 TABLET | Refills: 0 | Status: SHIPPED | OUTPATIENT
Start: 2022-02-03 | End: 2022-06-09

## 2022-04-14 ENCOUNTER — RA CDI HCC (OUTPATIENT)
Dept: OTHER | Facility: HOSPITAL | Age: 72
End: 2022-04-14

## 2022-04-14 NOTE — PROGRESS NOTES
[address]  coding opportunities       Chart reviewed, no opportunity found: CHART REVIEWED, NO OPPORTUNITY FOUND        Patients Insurance     Medicare Insurance: Medicare

## 2022-04-20 ENCOUNTER — OFFICE VISIT (OUTPATIENT)
Dept: FAMILY MEDICINE CLINIC | Facility: MEDICAL CENTER | Age: 72
End: 2022-04-20
Payer: MEDICARE

## 2022-04-20 VITALS
WEIGHT: 154 LBS | TEMPERATURE: 99.2 F | HEART RATE: 80 BPM | HEIGHT: 60 IN | BODY MASS INDEX: 30.23 KG/M2 | DIASTOLIC BLOOD PRESSURE: 84 MMHG | SYSTOLIC BLOOD PRESSURE: 124 MMHG

## 2022-04-20 DIAGNOSIS — F41.9 ANXIETY: ICD-10-CM

## 2022-04-20 DIAGNOSIS — I10 HYPERTENSION, UNSPECIFIED TYPE: Primary | ICD-10-CM

## 2022-04-20 PROCEDURE — 99214 OFFICE O/P EST MOD 30 MIN: CPT | Performed by: FAMILY MEDICINE

## 2022-04-22 NOTE — PROGRESS NOTES
Assessment/Plan:    Hypertension  Blood pressure today is 124/84  She takes Dyazide q day  she tolerates that well  Continue Dyazide, continue attempts to lose weight, low-salt diet  Also exercise  Anxiety  She is in a failed marriage  She does not feel that she can leave him because she cannot afford to live by herself  Although there other things that keep her in the house, notably her daughter  The daughter is adult and is not in the house but lives nearby  She is taking paroxetine  20 mg  It is mostly effective  She also has a therapist and she frequently sees him  Continue    Oxygen, continue the counseling, continue follow-up here  Problem List Items Addressed This Visit        Cardiovascular and Mediastinum    Hypertension - Primary     Blood pressure today is 124/84  She takes Dyazide q day  she tolerates that well  Continue Dyazide, continue attempts to lose weight, low-salt diet  Also exercise  Other    Anxiety     She is in a failed marriage  She does not feel that she can leave him because she cannot afford to live by herself  Although there other things that keep her in the house, notably her daughter  The daughter is adult and is not in the house but lives nearby  She is taking paroxetine  20 mg  It is mostly effective  She also has a therapist and she frequently sees him  Continue    Oxygen, continue the counseling, continue follow-up here  Subjective:      Patient ID: Margarita Henson is a 66 y o  female  The following portions of the patient's history were reviewed and updated as appropriate: allergies, current medications, past family history, past medical history, past social history, past surgical history and problem list     Review of Systems   Constitutional: Negative for chills and fever  HENT: Negative for ear pain and sore throat  Eyes: Negative for pain and visual disturbance     Respiratory: Negative for cough and shortness of breath  Cardiovascular: Negative for chest pain and palpitations  Gastrointestinal: Negative for abdominal pain and vomiting  Genitourinary: Negative for dysuria and hematuria  Musculoskeletal: Negative for arthralgias and back pain  Skin: Negative for color change and rash  Neurological: Negative for seizures and syncope  Psychiatric/Behavioral: Positive for dysphoric mood  Negative for agitation, behavioral problems, confusion, self-injury, sleep disturbance and suicidal ideas  The patient is nervous/anxious  All other systems reviewed and are negative  Objective:      /84 (BP Location: Left arm, Patient Position: Sitting, Cuff Size: Adult)   Pulse 80   Temp 99 2 °F (37 3 °C)   Ht 5' (1 524 m)   Wt 69 9 kg (154 lb)   BMI 30 08 kg/m²          Physical Exam  Vitals and nursing note reviewed  Constitutional:       Appearance: Normal appearance  She is well-developed  HENT:      Head: Normocephalic  Right Ear: Hearing, ear canal and external ear normal       Left Ear: Hearing, tympanic membrane, ear canal and external ear normal       Nose: Nose normal  No mucosal edema or rhinorrhea  Mouth/Throat:      Pharynx: Uvula midline  No oropharyngeal exudate  Eyes:      General: Lids are normal       Conjunctiva/sclera: Conjunctivae normal       Pupils: Pupils are equal, round, and reactive to light  Neck:      Thyroid: No thyroid mass or thyromegaly  Vascular: No carotid bruit  Cardiovascular:      Rate and Rhythm: Normal rate and regular rhythm  Pulses: Normal pulses  Heart sounds: Normal heart sounds, S1 normal and S2 normal  No murmur heard  No gallop  Pulmonary:      Effort: Pulmonary effort is normal       Breath sounds: Normal breath sounds  No wheezing or rales  Abdominal:      General: Bowel sounds are normal       Palpations: Abdomen is soft  Tenderness: There is no abdominal tenderness        Hernia: No hernia is present  Musculoskeletal:         General: Normal range of motion  Lymphadenopathy:      Cervical: No cervical adenopathy  Skin:     General: Skin is warm and dry  Findings: No rash  Neurological:      Mental Status: She is oriented to person, place, and time  Cranial Nerves: No cranial nerve deficit  Sensory: No sensory deficit  Coordination: Coordination normal    Psychiatric:         Speech: Speech normal          Behavior: Behavior normal  Behavior is cooperative  Thought Content:  Thought content normal          Judgment: Judgment normal

## 2022-04-22 NOTE — ASSESSMENT & PLAN NOTE
She is in a failed marriage  She does not feel that she can leave him because she cannot afford to live by herself  Although there other things that keep her in the house, notably her daughter  The daughter is adult and is not in the house but lives nearby  She is taking paroxetine  20 mg  It is mostly effective  She also has a therapist and she frequently sees him  Continue    Oxygen, continue the counseling, continue follow-up here

## 2022-04-22 NOTE — ASSESSMENT & PLAN NOTE
Blood pressure today is 124/84  She takes Dyazide q day  she tolerates that well  Continue Dyazide, continue attempts to lose weight, low-salt diet  Also exercise

## 2022-06-09 DIAGNOSIS — I10 ESSENTIAL HYPERTENSION: ICD-10-CM

## 2022-06-09 RX ORDER — TRIAMTERENE AND HYDROCHLOROTHIAZIDE 37.5; 25 MG/1; MG/1
TABLET ORAL
Qty: 90 TABLET | Refills: 0 | Status: SHIPPED | OUTPATIENT
Start: 2022-06-09

## 2022-07-12 ENCOUNTER — CLINICAL SUPPORT (OUTPATIENT)
Dept: FAMILY MEDICINE CLINIC | Facility: MEDICAL CENTER | Age: 72
End: 2022-07-12
Payer: MEDICARE

## 2022-07-12 DIAGNOSIS — Z11.1 PPD SCREENING TEST: Primary | ICD-10-CM

## 2022-07-12 PROCEDURE — 86580 TB INTRADERMAL TEST: CPT

## 2022-07-14 ENCOUNTER — CLINICAL SUPPORT (OUTPATIENT)
Dept: FAMILY MEDICINE CLINIC | Facility: MEDICAL CENTER | Age: 72
End: 2022-07-14

## 2022-07-14 DIAGNOSIS — Z11.1 ENCOUNTER FOR PPD SKIN TEST READING: Primary | ICD-10-CM

## 2022-07-14 LAB
INDURATION: 0 MM
TB SKIN TEST: NEGATIVE

## 2022-08-08 DIAGNOSIS — F41.1 GAD (GENERALIZED ANXIETY DISORDER): ICD-10-CM

## 2022-08-09 RX ORDER — PAROXETINE HYDROCHLORIDE 20 MG/1
TABLET, FILM COATED ORAL
Qty: 90 TABLET | Refills: 0 | Status: SHIPPED | OUTPATIENT
Start: 2022-08-09

## 2022-11-20 DIAGNOSIS — F41.1 GAD (GENERALIZED ANXIETY DISORDER): ICD-10-CM

## 2022-11-21 RX ORDER — PAROXETINE HYDROCHLORIDE 20 MG/1
TABLET, FILM COATED ORAL
Qty: 90 TABLET | Refills: 0 | Status: SHIPPED | OUTPATIENT
Start: 2022-11-21

## 2023-02-28 DIAGNOSIS — Z13.220 SCREENING FOR LIPID DISORDERS: ICD-10-CM

## 2023-02-28 DIAGNOSIS — F41.1 GAD (GENERALIZED ANXIETY DISORDER): ICD-10-CM

## 2023-02-28 DIAGNOSIS — I10 ESSENTIAL HYPERTENSION: Primary | ICD-10-CM

## 2023-02-28 DIAGNOSIS — Z13.29 SCREENING FOR THYROID DISORDER: ICD-10-CM

## 2023-02-28 RX ORDER — PAROXETINE HYDROCHLORIDE 20 MG/1
TABLET, FILM COATED ORAL
Qty: 90 TABLET | Refills: 0 | Status: SHIPPED | OUTPATIENT
Start: 2023-02-28

## 2023-03-02 NOTE — TELEPHONE ENCOUNTER
Pt returned call  Scheduled AWV for 4/26/23  Pt asked if Dr Paulina Izaguirre would please put bloodwork orders in for her  She will go have it done a week or so before her appt      Routed to Dr Paulina Izaguirre

## 2023-04-19 ENCOUNTER — APPOINTMENT (OUTPATIENT)
Dept: LAB | Facility: CLINIC | Age: 73
End: 2023-04-19

## 2023-04-19 DIAGNOSIS — Z13.29 SCREENING FOR THYROID DISORDER: ICD-10-CM

## 2023-04-19 DIAGNOSIS — Z13.220 SCREENING FOR LIPID DISORDERS: ICD-10-CM

## 2023-04-19 DIAGNOSIS — I10 ESSENTIAL HYPERTENSION: ICD-10-CM

## 2023-04-19 LAB
ALBUMIN SERPL BCP-MCNC: 3.7 G/DL (ref 3.5–5)
ALP SERPL-CCNC: 59 U/L (ref 46–116)
ALT SERPL W P-5'-P-CCNC: 18 U/L (ref 12–78)
ANION GAP SERPL CALCULATED.3IONS-SCNC: 2 MMOL/L (ref 4–13)
AST SERPL W P-5'-P-CCNC: 11 U/L (ref 5–45)
BASOPHILS # BLD AUTO: 0.04 THOUSANDS/ΜL (ref 0–0.1)
BASOPHILS NFR BLD AUTO: 1 % (ref 0–1)
BILIRUB SERPL-MCNC: 0.58 MG/DL (ref 0.2–1)
BUN SERPL-MCNC: 20 MG/DL (ref 5–25)
CALCIUM SERPL-MCNC: 8.9 MG/DL (ref 8.3–10.1)
CHLORIDE SERPL-SCNC: 104 MMOL/L (ref 96–108)
CHOLEST SERPL-MCNC: 199 MG/DL
CO2 SERPL-SCNC: 30 MMOL/L (ref 21–32)
CREAT SERPL-MCNC: 1.02 MG/DL (ref 0.6–1.3)
EOSINOPHIL # BLD AUTO: 0.15 THOUSAND/ΜL (ref 0–0.61)
EOSINOPHIL NFR BLD AUTO: 2 % (ref 0–6)
ERYTHROCYTE [DISTWIDTH] IN BLOOD BY AUTOMATED COUNT: 13.2 % (ref 11.6–15.1)
GFR SERPL CREATININE-BSD FRML MDRD: 54 ML/MIN/1.73SQ M
GLUCOSE P FAST SERPL-MCNC: 100 MG/DL (ref 65–99)
HCT VFR BLD AUTO: 45.8 % (ref 34.8–46.1)
HDLC SERPL-MCNC: 69 MG/DL
HGB BLD-MCNC: 14.6 G/DL (ref 11.5–15.4)
IMM GRANULOCYTES # BLD AUTO: 0.02 THOUSAND/UL (ref 0–0.2)
IMM GRANULOCYTES NFR BLD AUTO: 0 % (ref 0–2)
LDLC SERPL CALC-MCNC: 112 MG/DL (ref 0–100)
LYMPHOCYTES # BLD AUTO: 2.41 THOUSANDS/ΜL (ref 0.6–4.47)
LYMPHOCYTES NFR BLD AUTO: 35 % (ref 14–44)
MCH RBC QN AUTO: 30.2 PG (ref 26.8–34.3)
MCHC RBC AUTO-ENTMCNC: 31.9 G/DL (ref 31.4–37.4)
MCV RBC AUTO: 95 FL (ref 82–98)
MONOCYTES # BLD AUTO: 0.59 THOUSAND/ΜL (ref 0.17–1.22)
MONOCYTES NFR BLD AUTO: 9 % (ref 4–12)
NEUTROPHILS # BLD AUTO: 3.63 THOUSANDS/ΜL (ref 1.85–7.62)
NEUTS SEG NFR BLD AUTO: 53 % (ref 43–75)
NONHDLC SERPL-MCNC: 130 MG/DL
NRBC BLD AUTO-RTO: 0 /100 WBCS
PLATELET # BLD AUTO: 304 THOUSANDS/UL (ref 149–390)
PMV BLD AUTO: 11.4 FL (ref 8.9–12.7)
POTASSIUM SERPL-SCNC: 3.8 MMOL/L (ref 3.5–5.3)
PROT SERPL-MCNC: 6.9 G/DL (ref 6.4–8.4)
RBC # BLD AUTO: 4.84 MILLION/UL (ref 3.81–5.12)
SODIUM SERPL-SCNC: 136 MMOL/L (ref 135–147)
TRIGL SERPL-MCNC: 91 MG/DL
TSH SERPL DL<=0.05 MIU/L-ACNC: 1.43 UIU/ML (ref 0.45–4.5)
WBC # BLD AUTO: 6.84 THOUSAND/UL (ref 4.31–10.16)

## 2023-04-26 ENCOUNTER — OFFICE VISIT (OUTPATIENT)
Dept: FAMILY MEDICINE CLINIC | Facility: MEDICAL CENTER | Age: 73
End: 2023-04-26

## 2023-04-26 VITALS
SYSTOLIC BLOOD PRESSURE: 128 MMHG | BODY MASS INDEX: 27.48 KG/M2 | DIASTOLIC BLOOD PRESSURE: 74 MMHG | WEIGHT: 140 LBS | HEART RATE: 76 BPM | HEIGHT: 60 IN | RESPIRATION RATE: 16 BRPM

## 2023-04-26 DIAGNOSIS — Z00.00 PREVENTATIVE HEALTH CARE: Primary | ICD-10-CM

## 2023-04-26 DIAGNOSIS — F41.9 ANXIETY: ICD-10-CM

## 2023-04-26 DIAGNOSIS — I10 HYPERTENSION, UNSPECIFIED TYPE: ICD-10-CM

## 2023-04-26 DIAGNOSIS — C55 MALIGNANT NEOPLASM OF UTERUS, UNSPECIFIED SITE (HCC): ICD-10-CM

## 2023-04-26 PROBLEM — E66.09 CLASS 1 OBESITY DUE TO EXCESS CALORIES WITHOUT SERIOUS COMORBIDITY WITH BODY MASS INDEX (BMI) OF 30.0 TO 30.9 IN ADULT: Status: RESOLVED | Noted: 2021-10-15 | Resolved: 2023-04-26

## 2023-04-26 PROBLEM — E66.811 CLASS 1 OBESITY DUE TO EXCESS CALORIES WITHOUT SERIOUS COMORBIDITY WITH BODY MASS INDEX (BMI) OF 30.0 TO 30.9 IN ADULT: Status: RESOLVED | Noted: 2021-10-15 | Resolved: 2023-04-26

## 2023-04-26 RX ORDER — ASPIRIN 81 MG/1
81 TABLET ORAL DAILY
COMMUNITY

## 2023-04-26 RX ORDER — GLUCOSAMINE/MSM/CHONDROITIN A 500-83-400
TABLET ORAL
COMMUNITY

## 2023-04-26 NOTE — ASSESSMENT & PLAN NOTE
She is in a failed relationship with her   She tells me that he is a slob and a hoarder  He does not bathe, does not take care of the house and plumbing  It has been going on for several years  She tells me that she cannot leave him because she cannot afford an apartment  Also, she does not have the money for an   Therefore she is taking Paxil once a day and as needed lorazepam   We will continue to do that  SHe is also going to a counselor and she sees him regularly

## 2023-04-26 NOTE — ASSESSMENT & PLAN NOTE
Blood pressure today is 128/74  She is just taking Maxide  She is losing some weight, and exercising  Continue that  Also continue Maxide and a low-salt diet

## 2023-04-26 NOTE — PROGRESS NOTES
Assessment and Plan:     Problem List Items Addressed This Visit        Cardiovascular and Mediastinum    Hypertension     Blood pressure today is 128/74  She is just taking Maxide  She is losing some weight, and exercising  Continue that  Also continue Maxide and a low-salt diet  Genitourinary    Uterine cancer ([address] )     In remission x10 years  She is due to see her gynecologic oncologist             Other    Anxiety     She is in a failed relationship with her   She tells me that he is a slob and a hoarder  He does not bathe, does not take care of the house and plumbing  It has been going on for several years  She tells me that she cannot leave him because she cannot afford an apartment  Also, she does not have the money for an   Therefore she is taking Paxil once a day and as needed lorazepam   We will continue to do that  SHe is also going to a counselor and she sees him regularly  Other Visit Diagnoses     Preventative health care    -  Primary           Preventive health issues were discussed with patient, and age appropriate screening tests were ordered as noted in patient's After Visit Summary  Personalized health advice and appropriate referrals for health education or preventive services given if needed, as noted in patient's After Visit Summary  History of Present Illness:     Patient presents for a Medicare Wellness Visit    This is a pleasant 64-year-old woman who lives with her   They have 3 adopted children all adults  She retired, 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  She is a uterine cancer survivor  She declines a mammogram and colonoscopy order  Patient Care Team:  Mi Hunter MD as PCP - Maryanne Serna MD  Author Areas, MD     Review of Systems:     Review of Systems   Constitutional: Negative for chills and fever  HENT: Negative for ear pain, postnasal drip, rhinorrhea, sore throat and trouble swallowing      Eyes: Negative for pain, redness and visual disturbance  Respiratory: Negative for cough and shortness of breath  Cardiovascular: Negative for chest pain and palpitations  Gastrointestinal: Negative for abdominal pain, constipation, diarrhea and vomiting  Genitourinary: Negative for dysuria, frequency, hematuria and urgency  Musculoskeletal: Negative for arthralgias, back pain, joint swelling and myalgias  Skin: Negative for color change and rash  Neurological: Negative for seizures and syncope  Hematological: Negative for adenopathy  Psychiatric/Behavioral: Negative for decreased concentration, dysphoric mood and sleep disturbance  The patient is not nervous/anxious  All other systems reviewed and are negative         Problem List:     Patient Active Problem List   Diagnosis   • Uterine cancer ([facility] )   • Hypertension   • Anxiety      Past Medical and Surgical History:     Past Medical History:   Diagnosis Date   • Anxiety    • Cancer ([facility] )     Uterine Ca and Skin   • Hypertension      Past Surgical History:   Procedure Laterality Date   • ABDOMINAL HYSTERECTOMY      with removal of both ovaries    • HYSTERECTOMY        Family History:     Family History   Problem Relation Age of Onset   • Diabetes Mother    • Heart disease Mother    • Stroke Mother    • Kidney cancer Brother       Social History:     Social History     Socioeconomic History   • Marital status: /Civil Union     Spouse name: None   • Number of children: None   • Years of education: None   • Highest education level: None   Occupational History   • None   Tobacco Use   • Smoking status: Never   • Smokeless tobacco: Never   Vaping Use   • Vaping Use: Never used   Substance and Sexual Activity   • Alcohol use: No     Comment: socially    • Drug use: Never   • Sexual activity: None   Other Topics Concern   • None   Social History Narrative   • None     Social Determinants of Health     Financial Resource Strain: Low Risk    • Difficulty of Paying Living Expenses: Not very hard   Food Insecurity: Not on file   Transportation Needs: No Transportation Needs   • Lack of Transportation (Medical): No   • Lack of Transportation (Non-Medical): No   Physical Activity: Not on file   Stress: Not on file   Social Connections: Not on file   Intimate Partner Violence: Not on file   Housing Stability: Not on file      Medications and Allergies:     Current Outpatient Medications   Medication Sig Dispense Refill   • Ascorbic Acid (VITAMIN C PO) Take by mouth     • aspirin (ECOTRIN LOW STRENGTH) 81 mg EC tablet Take 81 mg by mouth daily     • Calcium Carbonate-Vitamin D 600-200 MG-UNIT CAPS Take 1 tablet by mouth     • co-enzyme Q-10 30 mg Take by mouth     • ELDERBERRY PO Take by mouth     • LORazepam (ATIVAN) 0 5 mg tablet Take 1 tablet (0 5 mg total) by mouth every 8 (eight) hours as needed for anxiety 15 tablet 0   • Multiple Vitamins-Minerals (MULTIVITAMIN ADULT EXTRA C PO) Take 1 tablet by mouth daily     • Multiple Vitamins-Minerals (ZINC PO) Take by mouth     • PARoxetine (PAXIL) 20 mg tablet Take 1 tablet by mouth once daily 90 tablet 0   • triamterene-hydrochlorothiazide (MAXZIDE-25) 37 5-25 mg per tablet Take 1 tablet by mouth once daily 90 tablet 0     No current facility-administered medications for this visit       Allergies   Allergen Reactions   • Erythromycin Base Other (See Comments)     Rash DIARRHEA   • Penicillins Other (See Comments)     ALL MYCINS RASH        Immunizations:     Immunization History   Administered Date(s) Administered   • COVID-19 PFIZER VACCINE 0 3 ML IM 01/20/2021, 02/10/2021   • Influenza, seasonal, injectable 02/19/2016   • Pneumococcal Conjugate 13-Valent 09/03/2019   • Pneumococcal Polysaccharide PPV23 03/17/2017   • Tdap 05/29/2021   • Tuberculin Skin Test-PPD Intradermal 01/06/2016, 01/18/2016, 03/21/2017, 06/05/2018, 06/04/2019, 06/29/2020, 07/12/2021, 07/12/2022      Health Maintenance:         Topic Date Due   • Hepatitis C Screening  Never done   • Colorectal Cancer Screening  Never done   • Breast Cancer Screening: Mammogram  03/19/2021   • DXA SCAN  04/30/2023         Topic Date Due   • COVID-19 Vaccine (3 - Booster for Pfizer series) 04/07/2021   • Influenza Vaccine (1) 09/01/2022      Medicare Screening Tests and Risk Assessments:     Ritesh Murry is here for her Subsequent Wellness visit  Health Risk Assessment:   Patient rates overall health as very good  Patient feels that their physical health rating is slightly better  Patient is dissatisfied with their life  Eyesight was rated as same  Hearing was rated as slightly worse  Patient feels that their emotional and mental health rating is same  Patients states they are never, rarely angry  Patient states they are sometimes unusually tired/fatigued  Pain experienced in the last 7 days has been some  Patient's pain rating has been 3/10  Patient states that she has experienced no weight loss or gain in last 6 months  Fall Risk Screening: In the past year, patient has experienced: no history of falling in past year      Urinary Incontinence Screening:   Patient has not leaked urine accidently in the last six months  Home Safety:  Patient does not have trouble with stairs inside or outside of their home  Patient has working smoke alarms and has working carbon monoxide detector  Nutrition:   Current diet is Regular  Medications:   Patient is currently taking over-the-counter supplements  OTC medications include: see medication list  Patient is able to manage medications  Activities of Daily Living (ADLs)/Instrumental Activities of Daily Living (IADLs):   Walk and transfer into and out of bed and chair?: Yes  Dress and groom yourself?: Yes    Bathe or shower yourself?: Yes    Feed yourself?  Yes  Do your laundry/housekeeping?: Yes  Manage your money, pay your bills and track your expenses?: Yes  Make your own meals?: Yes    Do your own shopping?: Yes    Previous Hospitalizations:   Any hospitalizations or ED visits within the last 12 months?: No      Advance Care Planning:   Living will: No    Durable POA for healthcare: Yes    Advanced directive: No      Cognitive Screening:   Provider or family/friend/caregiver concerned regarding cognition?: No    PREVENTIVE SCREENINGS      Cardiovascular Screening:    General: Screening Current      Diabetes Screening:     General: Screening Current      Cervical Cancer Screening:    General: Screening Not Indicated      Osteoporosis Screening:    General: Screening Current      Lung Cancer Screening:     General: Screening Not Indicated    Screening, Brief Intervention, and Referral to Treatment (SBIRT)    Screening  Typical number of drinks in a day: 0  Typical number of drinks in a week: 0  Interpretation: Low risk drinking behavior  AUDIT-C Screenin) How often did you have a drink containing alcohol in the past year? monthly or less  2) How many drinks did you have on a typical day when you were drinking in the past year? 1 to 2  3) How often did you have 6 or more drinks on one occasion in the past year? never    AUDIT-C Score: 1  Interpretation: Score 0-2 (female): Negative screen for alcohol misuse    Single Item Drug Screening:  How often have you used an illegal drug (including marijuana) or a prescription medication for non-medical reasons in the past year? never    Single Item Drug Screen Score: 0  Interpretation: Negative screen for possible drug use disorder    No results found  Physical Exam:     /74 (BP Location: Left arm, Patient Position: Sitting, Cuff Size: Adult)   Pulse 76   Resp 16   Ht 5' (1 524 m)   Wt 63 5 kg (140 lb)   BMI 27 34 kg/m²     Physical Exam  Vitals and nursing note reviewed  Constitutional:       Appearance: Normal appearance  She is well-developed  HENT:      Head: Normocephalic        Right Ear: Tympanic membrane and ear canal normal       Left Ear: Tympanic membrane and ear canal normal       Nose: Nose normal  No mucosal edema, congestion or rhinorrhea  Mouth/Throat:      Mouth: Mucous membranes are moist       Pharynx: Uvula midline  No oropharyngeal exudate  Tonsils: No tonsillar exudate  Eyes:      General: Lids are normal       Conjunctiva/sclera: Conjunctivae normal       Pupils: Pupils are equal, round, and reactive to light  Neck:      Thyroid: No thyromegaly  Vascular: No carotid bruit  Trachea: Trachea normal    Cardiovascular:      Rate and Rhythm: Normal rate and regular rhythm  Pulses: Normal pulses  Heart sounds: Normal heart sounds, S1 normal and S2 normal  No murmur heard  Pulmonary:      Effort: Pulmonary effort is normal  No respiratory distress  Breath sounds: Normal breath sounds  No wheezing, rhonchi or rales  Abdominal:      General: Bowel sounds are normal       Palpations: Abdomen is soft  Tenderness: There is no abdominal tenderness  Hernia: No hernia is present  Musculoskeletal:         General: No swelling or deformity  Normal range of motion  Cervical back: Normal range of motion  Lymphadenopathy:      Cervical: No cervical adenopathy  Skin:     General: Skin is warm and dry  Findings: No rash  Neurological:      General: No focal deficit present  Mental Status: She is alert and oriented to person, place, and time  Mental status is at baseline  Cranial Nerves: No cranial nerve deficit  Sensory: No sensory deficit  Coordination: Coordination normal       Deep Tendon Reflexes: Reflexes are normal and symmetric  Psychiatric:         Mood and Affect: Mood normal          Speech: Speech normal          Behavior: Behavior normal  Behavior is cooperative  Thought Content:  Thought content normal          Judgment: Judgment normal           Andry Renteria MD

## 2023-05-08 NOTE — PROGRESS NOTES
Overall doing well  Hypertension is currently well controlled with Dyazide  She is tolerating the medication well  She tries to eat a low-salt diet  Her generalized anxiety disorder is doing fairly well  She has a broken relationship with her  and lives in a house in bed need of repair  She just try suspend is little time is home is possible  Past medical history, past surgical history, family medical history, social history, and medication list were all reviewed  Review of Systems   Constitutional: Negative for activity change, appetite change, fatigue and fever  HENT: Negative for congestion, ear pain, hearing loss, nosebleeds, postnasal drip, rhinorrhea and trouble swallowing  Eyes: Negative for photophobia, pain, redness and visual disturbance  Respiratory: Negative for cough, chest tightness, shortness of breath and wheezing  Cardiovascular: Negative for chest pain and palpitations  Gastrointestinal: Negative for abdominal pain, blood in stool, constipation, diarrhea, nausea and vomiting  Endocrine: Negative for cold intolerance, heat intolerance, polydipsia, polyphagia and polyuria  Genitourinary: Negative for difficulty urinating, dyspareunia, dysuria, flank pain, frequency, menstrual problem, pelvic pain, urgency, vaginal bleeding and vaginal discharge  Musculoskeletal: Negative for arthralgias, gait problem, joint swelling and myalgias  Skin: Negative for rash and wound  Neurological: Negative for dizziness, tremors, seizures, syncope, speech difficulty, weakness, light-headedness and headaches  Psychiatric/Behavioral: Negative for agitation, decreased concentration, dysphoric mood, sleep disturbance and suicidal ideas  The patient is not nervous/anxious  /70   Pulse 80   Resp 14   Ht 4' 11 5" (1 511 m)   Wt 70 5 kg (155 lb 8 oz)   BMI 30 88 kg/m²     Physical Exam   Constitutional: She is oriented to person, place, and time   Vital signs are normal  She appears well-developed and well-nourished  She is cooperative  HENT:   Head: Normocephalic  Right Ear: Tympanic membrane and ear canal normal    Left Ear: Tympanic membrane and ear canal normal    Nose: Nose normal  No mucosal edema or rhinorrhea  Mouth/Throat: Uvula is midline, oropharynx is clear and moist and mucous membranes are normal  No oropharyngeal exudate  No tonsillar exudate  Eyes: Pupils are equal, round, and reactive to light  Conjunctivae, EOM and lids are normal    Neck: Trachea normal  Carotid bruit is not present  No thyromegaly present  Cardiovascular: Normal rate, regular rhythm, S1 normal, S2 normal, normal heart sounds and normal pulses  No murmur heard  Pulmonary/Chest: Effort normal and breath sounds normal  No respiratory distress  She has no wheezes  She has no rhonchi  She has no rales  Abdominal: Soft  Normal appearance and bowel sounds are normal  There is no hepatosplenomegaly  There is no tenderness  No hernia  Lymphadenopathy:     She has no cervical adenopathy  Neurological: She is alert and oriented to person, place, and time  She has normal strength and normal reflexes  No cranial nerve deficit or sensory deficit  Coordination normal    Skin: Skin is warm, dry and intact  No rash noted  Psychiatric: She has a normal mood and affect  Her speech is normal and behavior is normal  Cognition and memory are normal    Nursing note and vitals reviewed  Blood work was reviewed  She has borderline hyperlipidemia  However her thyroid, liver, kidney blood sugar results all normal blood count fine  Will continue medications  Low-fat diet  Topical Retinoid counseling:  Patient advised to apply a pea-sized amount only at bedtime and wait 30 minutes after washing their face before applying.  If too drying, patient may add a non-comedogenic moisturizer. The patient verbalized understanding of the proper use and possible adverse effects of retinoids.  All of the patient's questions and concerns were addressed.

## 2023-06-11 ENCOUNTER — NURSE TRIAGE (OUTPATIENT)
Dept: OTHER | Facility: OTHER | Age: 73
End: 2023-06-11

## 2023-06-11 DIAGNOSIS — F41.9 ANXIETY: Primary | ICD-10-CM

## 2023-06-11 RX ORDER — PAROXETINE HYDROCHLORIDE 20 MG/1
20 TABLET, FILM COATED ORAL DAILY
Qty: 7 TABLET | Refills: 0 | Status: SHIPPED | OUTPATIENT
Start: 2023-06-11

## 2023-06-11 NOTE — TELEPHONE ENCOUNTER
"  Reason for Disposition  • [1] Caller requesting a prescription renewal (no refills left), no triage required, AND [2] triager able to renew prescription per department policy    Answer Assessment - Initial Assessment Questions  1  DRUG NAME: \"What medicine do you need to have refilled? \"      Paroxetine (Paxil) 20 mg / tablet- take one tablet PO once daily  2  REFILLS REMAINING: \"How many refills are remaining? \" (Note: The label on the medicine or pill bottle will show how many refills are remaining  If there are no refills remaining, then a renewal may be needed )      None    3  EXPIRATION DATE: Juancarlosma Mcburney is the expiration date? \" (Note: The label states when the prescription will , and thus can no longer be refilled )      N/A    4  PRESCRIBING HCP: \"Who prescribed it? \" Reason: If prescribed by specialist, call should be referred to that group  Dr Shari Calvin    Protocols used: MEDICATION REFILL AND RENEWAL CALL-ADULT-      Per after hr standing order protocol- able to send 7 days worth of pts refill  Pt made aware to follow with PCP tomorrow for remaining refill  Pt verbalized understanding      "

## 2023-06-11 NOTE — TELEPHONE ENCOUNTER
"Regarding: Urgent Refill  ----- Message from Verner [employer] sent at 6/11/2023  8:44 AM EDT -----  \"I need an urgent refill on my medication  \"  Medication Refill Request     Name  PARoxetine (PAXIL)  Dose/Frequency 20mg/ Take 1 tablet by mouth once daily  Quantity 90 tablet  Verified pharmacy   [ [facility]    [facility], [address] [place] [address]   Phone:  [phone]  Fax:  [phone] ]  Verified ordering Provider   [ x ]  Does patient have enough for the next 3 days?  Yes [ ] No [ x ]    "

## 2023-06-16 DIAGNOSIS — F41.1 GAD (GENERALIZED ANXIETY DISORDER): ICD-10-CM

## 2023-06-16 RX ORDER — PAROXETINE HYDROCHLORIDE 20 MG/1
20 TABLET, FILM COATED ORAL DAILY
Qty: 90 TABLET | Refills: 0 | Status: SHIPPED | OUTPATIENT
Start: 2023-06-16

## 2023-07-01 DIAGNOSIS — I10 ESSENTIAL HYPERTENSION: ICD-10-CM

## 2023-07-03 RX ORDER — TRIAMTERENE AND HYDROCHLOROTHIAZIDE 37.5; 25 MG/1; MG/1
TABLET ORAL
Qty: 90 TABLET | Refills: 0 | Status: SHIPPED | OUTPATIENT
Start: 2023-07-03

## 2023-08-14 ENCOUNTER — CLINICAL SUPPORT (OUTPATIENT)
Dept: FAMILY MEDICINE CLINIC | Facility: MEDICAL CENTER | Age: 73
End: 2023-08-14
Payer: MEDICARE

## 2023-08-14 DIAGNOSIS — Z11.1 SCREENING FOR TUBERCULOSIS: Primary | ICD-10-CM

## 2023-08-14 PROCEDURE — 86580 TB INTRADERMAL TEST: CPT

## 2023-08-16 ENCOUNTER — CLINICAL SUPPORT (OUTPATIENT)
Dept: FAMILY MEDICINE CLINIC | Facility: MEDICAL CENTER | Age: 73
End: 2023-08-16

## 2023-08-16 DIAGNOSIS — Z11.1 ENCOUNTER FOR PPD SKIN TEST READING: Primary | ICD-10-CM

## 2023-08-16 LAB
INDURATION: 0 MM
TB SKIN TEST: NEGATIVE

## 2023-09-11 DIAGNOSIS — F41.1 GAD (GENERALIZED ANXIETY DISORDER): ICD-10-CM

## 2023-09-11 RX ORDER — PAROXETINE HYDROCHLORIDE 20 MG/1
20 TABLET, FILM COATED ORAL DAILY
Qty: 90 TABLET | Refills: 1 | Status: SHIPPED | OUTPATIENT
Start: 2023-09-11

## 2023-09-21 DIAGNOSIS — I10 ESSENTIAL HYPERTENSION: ICD-10-CM

## 2023-09-21 RX ORDER — TRIAMTERENE AND HYDROCHLOROTHIAZIDE 37.5; 25 MG/1; MG/1
TABLET ORAL
Qty: 90 TABLET | Refills: 0 | Status: SHIPPED | OUTPATIENT
Start: 2023-09-21

## 2023-12-27 DIAGNOSIS — I10 ESSENTIAL HYPERTENSION: ICD-10-CM

## 2023-12-27 RX ORDER — TRIAMTERENE AND HYDROCHLOROTHIAZIDE 37.5; 25 MG/1; MG/1
TABLET ORAL
Qty: 90 TABLET | Refills: 0 | Status: SHIPPED | OUTPATIENT
Start: 2023-12-27

## 2024-03-04 DIAGNOSIS — F41.1 GAD (GENERALIZED ANXIETY DISORDER): ICD-10-CM

## 2024-03-05 RX ORDER — PAROXETINE HYDROCHLORIDE 20 MG/1
20 TABLET, FILM COATED ORAL DAILY
Qty: 90 TABLET | Refills: 0 | Status: SHIPPED | OUTPATIENT
Start: 2024-03-05

## 2024-03-29 DIAGNOSIS — I10 ESSENTIAL HYPERTENSION: ICD-10-CM

## 2024-03-29 RX ORDER — TRIAMTERENE AND HYDROCHLOROTHIAZIDE 37.5; 25 MG/1; MG/1
TABLET ORAL
Qty: 30 TABLET | Refills: 0 | Status: SHIPPED | OUTPATIENT
Start: 2024-03-29

## 2024-04-17 ENCOUNTER — TELEPHONE (OUTPATIENT)
Age: 74
End: 2024-04-17

## 2024-05-13 ENCOUNTER — OFFICE VISIT (OUTPATIENT)
Dept: FAMILY MEDICINE CLINIC | Facility: MEDICAL CENTER | Age: 74
End: 2024-05-13
Payer: MEDICARE

## 2024-05-13 VITALS
OXYGEN SATURATION: 99 % | TEMPERATURE: 97.2 F | HEIGHT: 60 IN | HEART RATE: 80 BPM | RESPIRATION RATE: 18 BRPM | BODY MASS INDEX: 29.49 KG/M2 | DIASTOLIC BLOOD PRESSURE: 84 MMHG | WEIGHT: 150.2 LBS | SYSTOLIC BLOOD PRESSURE: 140 MMHG

## 2024-05-13 DIAGNOSIS — Z13.29 SCREENING FOR THYROID DISORDER: ICD-10-CM

## 2024-05-13 DIAGNOSIS — I10 ESSENTIAL HYPERTENSION: ICD-10-CM

## 2024-05-13 DIAGNOSIS — Z13.0 SCREENING FOR IRON DEFICIENCY ANEMIA: ICD-10-CM

## 2024-05-13 DIAGNOSIS — Z78.0 MENOPAUSE: ICD-10-CM

## 2024-05-13 DIAGNOSIS — Z13.820 ENCOUNTER FOR SCREENING FOR OSTEOPOROSIS: ICD-10-CM

## 2024-05-13 DIAGNOSIS — C55 MALIGNANT NEOPLASM OF UTERUS, UNSPECIFIED SITE (HCC): ICD-10-CM

## 2024-05-13 DIAGNOSIS — F41.1 GAD (GENERALIZED ANXIETY DISORDER): ICD-10-CM

## 2024-05-13 DIAGNOSIS — F41.9 ANXIETY: ICD-10-CM

## 2024-05-13 DIAGNOSIS — M17.12 ARTHRITIS OF LEFT KNEE: ICD-10-CM

## 2024-05-13 DIAGNOSIS — Z12.11 COLON CANCER SCREENING: ICD-10-CM

## 2024-05-13 DIAGNOSIS — Z00.00 PREVENTATIVE HEALTH CARE: ICD-10-CM

## 2024-05-13 DIAGNOSIS — Z13.220 SCREENING FOR LIPID DISORDERS: ICD-10-CM

## 2024-05-13 DIAGNOSIS — Z12.31 ENCOUNTER FOR SCREENING MAMMOGRAM FOR BREAST CANCER: ICD-10-CM

## 2024-05-13 DIAGNOSIS — I10 HYPERTENSION, UNSPECIFIED TYPE: Primary | ICD-10-CM

## 2024-05-13 PROCEDURE — G0438 PPPS, INITIAL VISIT: HCPCS | Performed by: FAMILY MEDICINE

## 2024-05-13 PROCEDURE — 99214 OFFICE O/P EST MOD 30 MIN: CPT | Performed by: FAMILY MEDICINE

## 2024-05-13 RX ORDER — PAROXETINE HYDROCHLORIDE 20 MG/1
20 TABLET, FILM COATED ORAL DAILY
Qty: 90 TABLET | Refills: 1 | Status: SHIPPED | OUTPATIENT
Start: 2024-05-13

## 2024-05-13 RX ORDER — TRIAMTERENE AND HYDROCHLOROTHIAZIDE 37.5; 25 MG/1; MG/1
1 TABLET ORAL DAILY
Qty: 90 TABLET | Refills: 3 | Status: SHIPPED | OUTPATIENT
Start: 2024-05-13

## 2024-05-13 NOTE — ASSESSMENT & PLAN NOTE
Blood pressure today is 140/84.  She is taking Maxide.    Continue Maxide, try to lose some weight, low-salt diet.

## 2024-05-13 NOTE — PATIENT INSTRUCTIONS
Medicare Preventive Visit Patient Instructions  Thank you for completing your Welcome to Medicare Visit or Medicare Annual Wellness Visit today. Your next wellness visit will be due in one year (5/14/2025).  The screening/preventive services that you may require over the next 5-10 years are detailed below. Some tests may not apply to you based off risk factors and/or age. Screening tests ordered at today's visit but not completed yet may show as past due. Also, please note that scanned in results may not display below.  Preventive Screenings:  Service Recommendations Previous Testing/Comments   Colorectal Cancer Screening  * Colonoscopy    * Fecal Occult Blood Test (FOBT)/Fecal Immunochemical Test (FIT)  * Fecal DNA/Cologuard Test  * Flexible Sigmoidoscopy Age: 45-75 years old   Colonoscopy: every 10 years (may be performed more frequently if at higher risk)  OR  FOBT/FIT: every 1 year  OR  Cologuard: every 3 years  OR  Sigmoidoscopy: every 5 years  Screening may be recommended earlier than age 45 if at higher risk for colorectal cancer. Also, an individualized decision between you and your healthcare provider will decide whether screening between the ages of 76-85 would be appropriate. Colonoscopy: Not on file  FOBT/FIT: Not on file  Cologuard: Not on file  Sigmoidoscopy: Not on file          Breast Cancer Screening Age: 40+ years old  Frequency: every 1-2 years  Not required if history of left and right mastectomy Mammogram: 03/19/2019        Cervical Cancer Screening Between the ages of 21-29, pap smear recommended once every 3 years.   Between the ages of 30-65, can perform pap smear with HPV co-testing every 5 years.   Recommendations may differ for women with a history of total hysterectomy, cervical cancer, or abnormal pap smears in past. Pap Smear: Not on file    Screening Not Indicated   Hepatitis C Screening Once for adults born between 1945 and 1965  More frequently in patients at high risk for Hepatitis  C Hep C Antibody: Not on file        Diabetes Screening 1-2 times per year if you're at risk for diabetes or have pre-diabetes Fasting glucose: 100 mg/dL (4/19/2023)  A1C: No results in last 5 years (No results in last 5 years)      Cholesterol Screening Once every 5 years if you don't have a lipid disorder. May order more often based on risk factors. Lipid panel: 04/19/2023    Screening Current     Other Preventive Screenings Covered by Medicare:  Abdominal Aortic Aneurysm (AAA) Screening: covered once if your at risk. You're considered to be at risk if you have a family history of AAA.  Lung Cancer Screening: covers low dose CT scan once per year if you meet all of the following conditions: (1) Age 55-77; (2) No signs or symptoms of lung cancer; (3) Current smoker or have quit smoking within the last 15 years; (4) You have a tobacco smoking history of at least 20 pack years (packs per day multiplied by number of years you smoked); (5) You get a written order from a healthcare provider.  Glaucoma Screening: covered annually if you're considered high risk: (1) You have diabetes OR (2) Family history of glaucoma OR (3)  aged 50 and older OR (4)  American aged 65 and older  Osteoporosis Screening: covered every 2 years if you meet one of the following conditions: (1) You're estrogen deficient and at risk for osteoporosis based off medical history and other findings; (2) Have a vertebral abnormality; (3) On glucocorticoid therapy for more than 3 months; (4) Have primary hyperparathyroidism; (5) On osteoporosis medications and need to assess response to drug therapy.   Last bone density test (DXA Scan): 04/30/2018.  HIV Screening: covered annually if you're between the age of 15-65. Also covered annually if you are younger than 15 and older than 65 with risk factors for HIV infection. For pregnant patients, it is covered up to 3 times per pregnancy.    Immunizations:  Immunization Recommendations    Influenza Vaccine Annual influenza vaccination during flu season is recommended for all persons aged >= 6 months who do not have contraindications   Pneumococcal Vaccine   * Pneumococcal conjugate vaccine = PCV13 (Prevnar 13), PCV15 (Vaxneuvance), PCV20 (Prevnar 20)  * Pneumococcal polysaccharide vaccine = PPSV23 (Pneumovax) Adults 19-63 yo with certain risk factors or if 65+ yo  If never received any pneumonia vaccine: recommend Prevnar 20 (PCV20)  Give PCV20 if previously received 1 dose of PCV13 or PPSV23   Hepatitis B Vaccine 3 dose series if at intermediate or high risk (ex: diabetes, end stage renal disease, liver disease)   Respiratory syncytial virus (RSV) Vaccine - COVERED BY MEDICARE PART D  * RSVPreF3 (Arexvy) CDC recommends that adults 60 years of age and older may receive a single dose of RSV vaccine using shared clinical decision-making (SCDM)   Tetanus (Td) Vaccine - COST NOT COVERED BY MEDICARE PART B Following completion of primary series, a booster dose should be given every 10 years to maintain immunity against tetanus. Td may also be given as tetanus wound prophylaxis.   Tdap Vaccine - COST NOT COVERED BY MEDICARE PART B Recommended at least once for all adults. For pregnant patients, recommended with each pregnancy.   Shingles Vaccine (Shingrix) - COST NOT COVERED BY MEDICARE PART B  2 shot series recommended in those 19 years and older who have or will have weakened immune systems or those 50 years and older     Health Maintenance Due:      Topic Date Due   • Hepatitis C Screening  Never done   • Colorectal Cancer Screening  Never done   • Breast Cancer Screening: Mammogram  03/19/2021   • DXA SCAN  04/30/2023     Immunizations Due:      Topic Date Due   • COVID-19 Vaccine (3 - 2023-24 season) 09/01/2023     Advance Directives   What are advance directives?  Advance directives are legal documents that state your wishes and plans for medical care. These plans are made ahead of time in case you  lose your ability to make decisions for yourself. Advance directives can apply to any medical decision, such as the treatments you want, and if you want to donate organs.   What are the types of advance directives?  There are many types of advance directives, and each state has rules about how to use them. You may choose a combination of any of the following:  Living will:  This is a written record of the treatment you want. You can also choose which treatments you do not want, which to limit, and which to stop at a certain time. This includes surgery, medicine, IV fluid, and tube feedings.   Durable power of  for healthcare (DPAHC):  This is a written record that states who you want to make healthcare choices for you when you are unable to make them for yourself. This person, called a proxy, is usually a family member or a friend. You may choose more than 1 proxy.  Do not resuscitate (DNR) order:  A DNR order is used in case your heart stops beating or you stop breathing. It is a request not to have certain forms of treatment, such as CPR. A DNR order may be included in other types of advance directives.  Medical directive:  This covers the care that you want if you are in a coma, near death, or unable to make decisions for yourself. You can list the treatments you want for each condition. Treatment may include pain medicine, surgery, blood transfusions, dialysis, IV or tube feedings, and a ventilator (breathing machine).  Values history:  This document has questions about your views, beliefs, and how you feel and think about life. This information can help others choose the care that you would choose.  Why are advance directives important?  An advance directive helps you control your care. Although spoken wishes may be used, it is better to have your wishes written down. Spoken wishes can be misunderstood, or not followed. Treatments may be given even if you do not want them. An advance directive may make  it easier for your family to make difficult choices about your care.   Weight Management   Why it is important to manage your weight:  Being overweight increases your risk of health conditions such as heart disease, high blood pressure, type 2 diabetes, and certain types of cancer. It can also increase your risk for osteoarthritis, sleep apnea, and other respiratory problems. Aim for a slow, steady weight loss. Even a small amount of weight loss can lower your risk of health problems.  How to lose weight safely:  A safe and healthy way to lose weight is to eat fewer calories and get regular exercise. You can lose up about 1 pound a week by decreasing the number of calories you eat by 500 calories each day.   Healthy meal plan for weight management:  A healthy meal plan includes a variety of foods, contains fewer calories, and helps you stay healthy. A healthy meal plan includes the following:  Eat whole-grain foods more often.  A healthy meal plan should contain fiber. Fiber is the part of grains, fruits, and vegetables that is not broken down by your body. Whole-grain foods are healthy and provide extra fiber in your diet. Some examples of whole-grain foods are whole-wheat breads and pastas, oatmeal, brown rice, and bulgur.  Eat a variety of vegetables every day.  Include dark, leafy greens such as spinach, kale, bolivar greens, and mustard greens. Eat yellow and orange vegetables such as carrots, sweet potatoes, and winter squash.   Eat a variety of fruits every day.  Choose fresh or canned fruit (canned in its own juice or light syrup) instead of juice. Fruit juice has very little or no fiber.  Eat low-fat dairy foods.  Drink fat-free (skim) milk or 1% milk. Eat fat-free yogurt and low-fat cottage cheese. Try low-fat cheeses such as mozzarella and other reduced-fat cheeses.  Choose meat and other protein foods that are low in fat.  Choose beans or other legumes such as split peas or lentils. Choose fish, skinless  poultry (chicken or turkey), or lean cuts of red meat (beef or pork). Before you cook meat or poultry, cut off any visible fat.   Use less fat and oil.  Try baking foods instead of frying them. Add less fat, such as margarine, sour cream, regular salad dressing and mayonnaise to foods. Eat fewer high-fat foods. Some examples of high-fat foods include french fries, doughnuts, ice cream, and cakes.  Eat fewer sweets.  Limit foods and drinks that are high in sugar. This includes candy, cookies, regular soda, and sweetened drinks.  Exercise:  Exercise at least 30 minutes per day on most days of the week. Some examples of exercise include walking, biking, dancing, and swimming. You can also fit in more physical activity by taking the stairs instead of the elevator or parking farther away from stores. Ask your healthcare provider about the best exercise plan for you.      © Copyright [employer] 2018 Information is for End User's use only and may not be sold, redistributed or otherwise used for commercial purposes. All illustrations and images included in CareNotes® are the copyrighted property of [employer] or [employer]

## 2024-05-13 NOTE — ASSESSMENT & PLAN NOTE
It has completely resolved at this time.   Rifampin Pregnancy And Lactation Text: This medication is Pregnancy Category C and it isn't know if it is safe during pregnancy. It is also excreted in breast milk and should not be used if you are breast feeding.

## 2024-05-13 NOTE — PROGRESS NOTES
Assessment and Plan:     Problem List Items Addressed This Visit        Cardiovascular and Mediastinum    Hypertension - Primary     Blood pressure today is 140/84.  She is taking Maxide.    Continue Maxide, try to lose some weight, low-salt diet.         Relevant Medications    triamterene-hydrochlorothiazide (MAXZIDE-25) 37.5-25 mg per tablet    Other Relevant Orders    Comprehensive metabolic panel       Genitourinary    Uterine cancer (HCC)     It has completely resolved at this time.            Behavioral Health    Anxiety     She has a difficult time with her .  Her  is not taking care of himself, he refused to see a urologist for his prostate cancer.  He does not do any work on their home.  She cannot leave him because she does not have the resources to move out.    I am prescribing her Paxil and she has a counselor.  She will continue both.        Other Visit Diagnoses     Preventative health care        Arthritis of left knee        Relevant Orders    XR knee 3 vw left non injury    Ambulatory Referral to Orthopedic Surgery    FAZAL (generalized anxiety disorder)        Relevant Medications    PARoxetine (PAXIL) 20 mg tablet    Screening for iron deficiency anemia        Relevant Orders    CBC and differential    Screening for lipid disorders        Relevant Orders    Lipid Panel with Direct LDL reflex    Screening for thyroid disorder        Relevant Orders    TSH, 3rd generation with Free T4 reflex    Menopause        Relevant Orders    DXA bone density spine hip and pelvis    Essential hypertension        Relevant Medications    triamterene-hydrochlorothiazide (MAXZIDE-25) 37.5-25 mg per tablet    Colon cancer screening        Relevant Orders    Cologuard    Encounter for screening for osteoporosis        Relevant Orders    DXA bone density spine hip and pelvis    Encounter for screening mammogram for breast cancer        Relevant Orders    Mammo screening bilateral w 3d & cad            Preventive health issues were discussed with patient, and age appropriate screening tests were ordered as noted in patient's After Visit Summary.  Personalized health advice and appropriate referrals for health education or preventive services given if needed, as noted in patient's After Visit Summary.     History of Present Illness:     Patient presents for a Medicare Wellness Visit    This is a 73-year-old woman.  She works as a healthcare worker.  She goes to [facility] home and in nursing homes.  She has 3 children, 2 were adopted.  She has many grandchildren.  She is a uterine cancer survivor.    She is due for colorectal cancer screening, mammograms, and DEXA scan.       Patient Care Team:  Oneil Tee MD as PCP - General  MD Pablo Castro MD     Review of Systems:     Review of Systems   Constitutional:  Negative for chills and fever.   HENT:  Negative for ear pain, postnasal drip, rhinorrhea, sore throat and trouble swallowing.    Eyes:  Negative for pain, redness and visual disturbance.   Respiratory:  Negative for cough and shortness of breath.    Cardiovascular:  Negative for chest pain and palpitations.   Gastrointestinal:  Negative for abdominal pain, constipation, diarrhea and vomiting.   Genitourinary:  Negative for dysuria, frequency, hematuria and urgency.   Musculoskeletal:  Positive for arthralgias (Left knee, chronic pain, she injured it several years ago.). Negative for back pain, joint swelling and myalgias.   Skin:  Negative for color change and rash.   Neurological:  Negative for seizures and syncope.   Hematological:  Negative for adenopathy.   Psychiatric/Behavioral:  Negative for decreased concentration, dysphoric mood and sleep disturbance. The patient is not nervous/anxious.    All other systems reviewed and are negative.       Problem List:     Patient Active Problem List   Diagnosis   • Uterine cancer (HCC)   • Hypertension   • Anxiety      Past Medical and Surgical  History:     Past Medical History:   Diagnosis Date   • Anxiety    • Cancer (HCC)     Uterine Ca and Skin   • Hypertension      Past Surgical History:   Procedure Laterality Date   • ABDOMINAL HYSTERECTOMY      with removal of both ovaries    • HYSTERECTOMY        Family History:     Family History   Problem Relation Age of Onset   • Diabetes Mother    • Heart disease Mother    • Stroke Mother    • Kidney cancer Brother       Social History:     Social History     Socioeconomic History   • Marital status: /Civil Union     Spouse name: None   • Number of children: None   • Years of education: None   • Highest education level: None   Occupational History   • None   Tobacco Use   • Smoking status: Never   • Smokeless tobacco: Never   Vaping Use   • Vaping status: Never Used   Substance and Sexual Activity   • Alcohol use: No     Comment: socially    • Drug use: Never   • Sexual activity: None   Other Topics Concern   • None   Social History Narrative   • None     Social Determinants of Health     Financial Resource Strain: Low Risk  (4/26/2023)    Overall Financial Resource Strain (CARDIA)    • Difficulty of Paying Living Expenses: Not very hard   Food Insecurity: No Food Insecurity (5/13/2024)    Hunger Vital Sign    • Worried About Running Out of Food in the Last Year: Never true    • Ran Out of Food in the Last Year: Never true   Transportation Needs: No Transportation Needs (5/13/2024)    PRAPARE - Transportation    • Lack of Transportation (Medical): No    • Lack of Transportation (Non-Medical): No   Physical Activity: Not on file   Stress: Not on file   Social Connections: Not on file   Intimate Partner Violence: Not on file   Housing Stability: Unknown (5/13/2024)    Housing Stability Vital Sign    • Unable to Pay for Housing in the Last Year: No    • Number of Places Lived in the Last Year: Not on file    • Unstable Housing in the Last Year: No      Medications and Allergies:     Current Outpatient  Medications   Medication Sig Dispense Refill   • Ascorbic Acid (VITAMIN C PO) Take by mouth     • aspirin (ECOTRIN LOW STRENGTH) 81 mg EC tablet Take 81 mg by mouth daily     • Calcium Carbonate-Vitamin D 600-200 MG-UNIT CAPS Take 1 tablet by mouth     • co-enzyme Q-10 30 mg Take by mouth     • ELDERBERRY PO Take by mouth     • LORazepam (ATIVAN) 0.5 mg tablet Take 1 tablet (0.5 mg total) by mouth every 8 (eight) hours as needed for anxiety 15 tablet 0   • Multiple Vitamins-Minerals (MULTIVITAMIN ADULT EXTRA C PO) Take 1 tablet by mouth daily     • Multiple Vitamins-Minerals (ZINC PO) Take by mouth     • PARoxetine (PAXIL) 20 mg tablet Take 1 tablet (20 mg total) by mouth daily 90 tablet 1   • triamterene-hydrochlorothiazide (MAXZIDE-25) 37.5-25 mg per tablet Take 1 tablet by mouth daily 90 tablet 3     No current facility-administered medications for this visit.     Allergies   Allergen Reactions   • Erythromycin Base Other (See Comments)     Rash DIARRHEA   • Penicillins Other (See Comments)     ALL MYCINS RASH        Immunizations:     Immunization History   Administered Date(s) Administered   • COVID-19 PFIZER VACCINE 0.3 ML IM 01/20/2021, 02/10/2021   • Influenza, seasonal, injectable 02/19/2016   • Pneumococcal Conjugate 13-Valent 09/03/2019   • Pneumococcal Polysaccharide PPV23 03/17/2017   • Tdap 05/29/2021   • Tuberculin Skin Test-PPD Intradermal 01/06/2016, 01/18/2016, 03/21/2017, 06/05/2018, 06/04/2019, 06/29/2020, 07/12/2021, 07/12/2022, 08/14/2023      Health Maintenance:         Topic Date Due   • Hepatitis C Screening  Never done   • Colorectal Cancer Screening  Never done   • Breast Cancer Screening: Mammogram  03/19/2021   • DXA SCAN  04/30/2023         Topic Date Due   • COVID-19 Vaccine (3 - 2023-24 season) 09/01/2023      Medicare Screening Tests and Risk Assessments:     Gina is here for her Subsequent Wellness visit.     Health Risk Assessment:   Patient rates overall health as very good.  Patient feels that their physical health rating is same. Patient is dissatisfied with their life. Eyesight was rated as same. Hearing was rated as slightly worse. Patient feels that their emotional and mental health rating is same. Patients states they are often angry. Patient states they are sometimes unusually tired/fatigued. Pain experienced in the last 7 days has been some. Patient's pain rating has been 4/10. Patient states that she has experienced weight loss or gain in last 6 months.     Depression Screening:   PHQ-2 Score: 0      Fall Risk Screening:   In the past year, patient has experienced: no history of falling in past year      Urinary Incontinence Screening:   Patient has not leaked urine accidently in the last six months.     Home Safety:  Patient has trouble with stairs inside or outside of their home. Patient has working smoke alarms and has working carbon monoxide detector. Home safety hazards include: poor household lighting.     Nutrition:   Current diet is Regular.     Medications:   Patient is currently taking over-the-counter supplements. OTC medications include: see medication list. Patient is able to manage medications.     Activities of Daily Living (ADLs)/Instrumental Activities of Daily Living (IADLs):   Walk and transfer into and out of bed and chair?: Yes  Dress and groom yourself?: Yes    Bathe or shower yourself?: Yes    Feed yourself? Yes  Do your laundry/housekeeping?: Yes  Manage your money, pay your bills and track your expenses?: Yes  Make your own meals?: Yes    Do your own shopping?: Yes    Previous Hospitalizations:   Any hospitalizations or ED visits within the last 12 months?: No      Advance Care Planning:   Living will: No    Durable POA for healthcare: Yes    Advanced directive: No      PREVENTIVE SCREENINGS      Cardiovascular Screening:    General: Screening Current      Cervical Cancer Screening:    General: Screening Not Indicated      Lung Cancer Screening:      General: Screening Not Indicated    Screening, Brief Intervention, and Referral to Treatment (SBIRT)    Screening  Typical number of drinks in a day: 0  Typical number of drinks in a week: 0  Interpretation: Low risk drinking behavior.    Single Item Drug Screening:  How often have you used an illegal drug (including marijuana) or a prescription medication for non-medical reasons in the past year? never    Single Item Drug Screen Score: 0  Interpretation: Negative screen for possible drug use disorder    No results found.     Physical Exam:     /84 (BP Location: Left arm, Patient Position: Sitting, Cuff Size: Standard)   Pulse 80   Temp (!) 97.2 °F (36.2 °C) (Temporal)   Resp 18   Ht 5' (1.524 m)   Wt 68.1 kg (150 lb 3.2 oz)   SpO2 99%   BMI 29.33 kg/m²     Physical Exam  Vitals and nursing note reviewed.   Constitutional:       Appearance: Normal appearance. She is well-developed.   HENT:      Head: Normocephalic.      Right Ear: Tympanic membrane and ear canal normal.      Left Ear: Tympanic membrane and ear canal normal.      Nose: Nose normal. No mucosal edema, congestion or rhinorrhea.      Mouth/Throat:      Mouth: Mucous membranes are moist.      Pharynx: Uvula midline. No oropharyngeal exudate.      Tonsils: No tonsillar exudate.   Eyes:      General: Lids are normal.      Conjunctiva/sclera: Conjunctivae normal.      Pupils: Pupils are equal, round, and reactive to light.   Neck:      Thyroid: No thyromegaly.      Vascular: No carotid bruit.      Trachea: Trachea normal.   Cardiovascular:      Rate and Rhythm: Normal rate and regular rhythm.      Pulses: Normal pulses.      Heart sounds: Normal heart sounds, S1 normal and S2 normal. No murmur heard.  Pulmonary:      Effort: Pulmonary effort is normal. No respiratory distress.      Breath sounds: Normal breath sounds. No wheezing, rhonchi or rales.   Abdominal:      General: Bowel sounds are normal.      Palpations: Abdomen is soft.       Tenderness: There is no abdominal tenderness.      Hernia: No hernia is present.   Musculoskeletal:         General: Deformity (Arthritis left knee) present. No swelling. Normal range of motion.      Cervical back: Normal range of motion.   Lymphadenopathy:      Cervical: No cervical adenopathy.   Skin:     General: Skin is warm and dry.      Findings: No rash.   Neurological:      General: No focal deficit present.      Mental Status: She is alert and oriented to person, place, and time. Mental status is at baseline.      Cranial Nerves: No cranial nerve deficit.      Sensory: No sensory deficit.      Coordination: Coordination normal.      Deep Tendon Reflexes: Reflexes are normal and symmetric.   Psychiatric:         Mood and Affect: Mood normal.         Speech: Speech normal.         Behavior: Behavior normal. Behavior is cooperative.         Thought Content: Thought content normal.         Judgment: Judgment normal.          Oneil Tee MD

## 2024-05-13 NOTE — ASSESSMENT & PLAN NOTE
She has a difficult time with her .  Her  is not taking care of himself, he refused to see a urologist for his prostate cancer.  He does not do any work on their home.  She cannot leave him because she does not have the resources to move out.    I am prescribing her Paxil and she has a counselor.  She will continue both.

## 2024-05-14 DIAGNOSIS — F41.9 ANXIETY: ICD-10-CM

## 2024-05-14 NOTE — TELEPHONE ENCOUNTER
Medication:     LORazepam (ATIVAN) 0.5 mg tablet       Dose/Frequency: Take 1 tablet (0.5 mg total) by mouth every 8 (eight) hours as needed for anxiety     Quantity: 15 tablet     Pharmacy: [address]     Office:   [x] PCP/Provider - Oneil Tee MD   [] Speciality/Provider -     Does the patient have enough for 3 days?   [x] Yes   [] No - Send as HP to POD

## 2024-05-15 ENCOUNTER — APPOINTMENT (OUTPATIENT)
Dept: RADIOLOGY | Age: 74
End: 2024-05-15
Payer: MEDICARE

## 2024-05-15 ENCOUNTER — TELEPHONE (OUTPATIENT)
Age: 74
End: 2024-05-15

## 2024-05-15 DIAGNOSIS — M17.12 ARTHRITIS OF LEFT KNEE: ICD-10-CM

## 2024-05-15 PROCEDURE — 73562 X-RAY EXAM OF KNEE 3: CPT

## 2024-05-15 RX ORDER — LORAZEPAM 0.5 MG/1
0.5 TABLET ORAL EVERY 8 HOURS PRN
Qty: 15 TABLET | Refills: 0 | Status: SHIPPED | OUTPATIENT
Start: 2024-05-15

## 2024-05-16 ENCOUNTER — OFFICE VISIT (OUTPATIENT)
Dept: OBGYN CLINIC | Facility: CLINIC | Age: 74
End: 2024-05-16
Payer: MEDICARE

## 2024-05-16 VITALS
BODY MASS INDEX: 29.45 KG/M2 | WEIGHT: 150 LBS | DIASTOLIC BLOOD PRESSURE: 67 MMHG | SYSTOLIC BLOOD PRESSURE: 127 MMHG | HEIGHT: 60 IN | HEART RATE: 68 BPM

## 2024-05-16 DIAGNOSIS — M25.562 CHRONIC PAIN OF LEFT KNEE: Primary | ICD-10-CM

## 2024-05-16 DIAGNOSIS — G89.29 CHRONIC PAIN OF LEFT KNEE: Primary | ICD-10-CM

## 2024-05-16 DIAGNOSIS — M17.12 ARTHRITIS OF LEFT KNEE: ICD-10-CM

## 2024-05-16 DIAGNOSIS — M17.12 PRIMARY OSTEOARTHRITIS OF LEFT KNEE: ICD-10-CM

## 2024-05-16 DIAGNOSIS — M25.462 EFFUSION OF LEFT KNEE: ICD-10-CM

## 2024-05-16 PROCEDURE — 99203 OFFICE O/P NEW LOW 30 MIN: CPT | Performed by: PHYSICIAN ASSISTANT

## 2024-05-16 PROCEDURE — 20610 DRAIN/INJ JOINT/BURSA W/O US: CPT | Performed by: PHYSICIAN ASSISTANT

## 2024-05-16 RX ORDER — LIDOCAINE HYDROCHLORIDE 10 MG/ML
2 INJECTION, SOLUTION INFILTRATION; PERINEURAL
Status: COMPLETED | OUTPATIENT
Start: 2024-05-16 | End: 2024-05-16

## 2024-05-16 RX ORDER — TRIAMCINOLONE ACETONIDE 40 MG/ML
80 INJECTION, SUSPENSION INTRA-ARTICULAR; INTRAMUSCULAR
Status: COMPLETED | OUTPATIENT
Start: 2024-05-16 | End: 2024-05-16

## 2024-05-16 RX ADMIN — TRIAMCINOLONE ACETONIDE 80 MG: 40 INJECTION, SUSPENSION INTRA-ARTICULAR; INTRAMUSCULAR at 18:00

## 2024-05-16 RX ADMIN — LIDOCAINE HYDROCHLORIDE 2 ML: 10 INJECTION, SOLUTION INFILTRATION; PERINEURAL at 18:00

## 2024-05-16 NOTE — PATIENT INSTRUCTIONS
Hyaluronic Acid (By injection)   Hyaluronic Acid (slm-xm-xvz-ON-ate AS-id)  Treats severe pain in your knee due to osteoarthritis.   Brand Name(s): Durolane, Euflexxa, Gel-One, GelSyn-3, GenVisc 850, Hyalgan, Hymovis, Monovisc, Orthovisc, Supartz FX, TriLURON, TriVisc, Visco-3   There may be other brand names for this medicine.  When This Medicine Should Not Be Used:   This medicine is not right for everyone. You should not receive it if you had an allergic reaction to hyaluronic acid or if you have a bleeding disorder.  How to Use This Medicine:   Injectable  Your doctor will tell you how many injections you will need. This medicine is injected into your knee joint.  A nurse or other health provider will give you this medicine.  Drugs and Foods to Avoid:      Ask your doctor or pharmacist before using any other medicine, including over-the-counter medicines, vitamins, and herbal products.      Warnings While Using This Medicine:   Tell your doctor if you are pregnant or breastfeeding, or if you have any allergies, including to birds, feathers, or eggs.  Rest your knee for 48 hours after you receive an injection. Do not do strenuous, weightbearing activities, such as jogging or tennis. Avoid activities that keep you standing for longer than 1 hour.  Possible Side Effects While Using This Medicine:   Call your doctor right away if you notice any of these side effects:  Allergic reaction: Itching or hives, swelling in your face or hands, swelling or tingling in your mouth or throat, chest tightness, trouble breathing  If you notice these less serious side effects, talk with your doctor:   Mild increase in pain or swelling in your knee  Pain, redness, or swelling where the medicine is injected  If you notice other side effects that you think are caused by this medicine, tell your doctor.   Call your doctor for medical advice about side effects. You may report side effects to FDA at 8-454-FDA-7572  © Copyright [employer]  2023 Information is for End User's use only and may not be sold, redistributed or otherwise used for commercial purposes.  The above information is an  only. It is not intended as medical advice for individual conditions or treatments. Talk to your doctor, nurse or pharmacist before following any medical regimen to see if it is safe and effective for you.

## 2024-05-16 NOTE — PROGRESS NOTES
Orthopaedic Surgery - Office Note  Gina Hassan (73 y.o. female)   : 1950   MRN: [id number]  Encounter Date: 2024    Chief Complaint   Patient presents with    Left Knee - Pain         Assessment/Plan  Chronic left knee pain  Left knee primary osteoarthritis  Left knee effusion    The diagnosis as well as treatment options were reviewed with the patient in the office today.  Symptomatic relief with icing 20 minutes on 1 hour off 3 times a day were encouraged.  She may use an oral anti-inflammatory such as Aleve 1 tablet twice daily with food stopping calling if any stomach upset occurs.  Asymptomatic cortisone injection could be considered.  Risks and benefits were reviewed.  Shared decision making was utilized.  She elected to proceed with the aspiration and cortisone injection tolerating it well without complication.  She did note significant decrease in pain post aspiration and injection with improved range of motion    Physical therapy was offered to help maintain range of motion and strength as well as decreasing symptoms.  Patient declines to consider PT or bracing at this time    She is aware of the current treatments are for symptoms and a more definitive treatment of the pathology would be a total knee arthroplasty.  She is adamant she does not wish to consider any surgery at this time.    Visco injections as a next step treatment option were reviewed       Recheck in 6 weeks with myself.        History of Present Illness  This is a new patient with ongoing left knee pain.  She discussed the symptoms with her PCP on 2024 who ordered x-rays which were available for review showing that the degree of arthritis.  She has not had any recent treatment.  No recent trauma is reported.  She reports the left knee pain has been ongoing in nature without any recent trauma.  She does notice swelling in the knee.  Most of the pain is in the medial aspect and is made worse with activity.  She reports she  does not wish to consider a knee replacement in any scenario at this time    She is not diabetic.    Review of Systems  Pertinent items are noted in HPI.  All other systems were reviewed and are negative.    Physical Exam  There were no vitals taken for this visit.  Cons: Appears well.  No apparent distress.  Psych: Alert. Oriented x3.  Mood and affect normal.    Patient's left knee is with a 1+ intra-articular effusion.  There is no skin breakdown lesion or signs of infection.  She is tender on the medial joint line.  She is nontender in the lateral joint line.  She has no instability or pain with valgus and varus stressing.  She has full extension and flexes to about 110 degrees in the office today limited by the pain from the effusion.  Her range of motion improves to 125 degrees without pain post aspiration.  Quad and hamstring strength are 5 out of 5.  Her gait is heel-to-toe.  She has no instability to Lachman and posterior drawer.  There is no calf tenderness and a negative Homans.  Her hip moves well and she has a negative straight leg raise            Studies Reviewed    LEFT KNEE     INDICATION:   Unilateral primary osteoarthritis, left knee.      COMPARISON:  None.     VIEWS:  XR KNEE 3 VW LEFT NON INJURY      FINDINGS:     There is no acute fracture or dislocation.     There is a small joint effusion.     Compared to prior study of 9/20/2021, there is mild narrowing of both the medial and lateral compartment spaces. Small osteophytes of the medial and lateral tibial plateau and femoral epicondyles. Patella is intact. Patellofemoral space preserved.     No lytic or blastic osseous lesion.     Soft tissues are unremarkable.     IMPRESSION:        No significant change from prior study.  Mild medial and lateral compartment degenerative changes as above.  Preserved patellofemoral space.  Small effusion.  No fracture        Electronically signed: 05/15/2024 02:33 PM Jethro Billingsley MD  Independent review of  "x-rays by myself in the office today is in agreement with radiologist interpretation.  Previous PCP notes were reviewed by myself in the office today.    Large joint arthrocentesis  Universal Protocol:  Consent: Verbal consent obtained.  Risks and benefits: risks, benefits and alternatives were discussed  Consent given by: patient  Time out: Immediately prior to procedure a \"time out\" was called to verify the correct patient, procedure, equipment, support staff and site/side marked as required.  Patient understanding: patient states understanding of the procedure being performed  Patient consent: the patient's understanding of the procedure matches consent given  Relevant documents: relevant documents present and verified  Test results: test results available and properly labeled  Site marked: the operative site was marked  Radiology Images displayed and confirmed. If images not available, report reviewed: imaging studies available  Patient identity confirmed: verbally with patient  Supporting Documentation  Indications: pain and joint swelling   Procedure Details  Preparation: Patient was prepped and draped in the usual sterile fashion  Needle size: 18 G  Ultrasound guidance: no  Approach: lateral  Medications administered: 2 mL lidocaine 1 %; 80 mg triamcinolone acetonide 40 mg/mL    Aspirate amount: 25 mL  Aspirate: clear and yellow  Patient tolerance: patient tolerated the procedure well with no immediate complications  Dressing:  Sterile dressing applied      Medical, Surgical, Family, and Social History  The patient's medical history, family history, and social history, were reviewed and updated as appropriate.    Past Medical History:   Diagnosis Date    Anxiety     Cancer (HCC)     Uterine Ca and Skin    Hypertension        Past Surgical History:   Procedure Laterality Date    ABDOMINAL HYSTERECTOMY      with removal of both ovaries     HYSTERECTOMY         Family History   Problem Relation Age of Onset    " Diabetes Mother     Heart disease Mother     Stroke Mother     Kidney cancer Brother        Social History     Occupational History    Not on file   Tobacco Use    Smoking status: Never    Smokeless tobacco: Never   Vaping Use    Vaping status: Never Used   Substance and Sexual Activity    Alcohol use: No     Comment: socially     Drug use: Never    Sexual activity: Not on file       Allergies   Allergen Reactions    Erythromycin Base Other (See Comments)     Rash DIARRHEA    Penicillins Other (See Comments)     ALL MYCINS RASH           Current Outpatient Medications:     Ascorbic Acid (VITAMIN C PO), Take by mouth, Disp: , Rfl:     aspirin (ECOTRIN LOW STRENGTH) 81 mg EC tablet, Take 81 mg by mouth daily, Disp: , Rfl:     Calcium Carbonate-Vitamin D 600-200 MG-UNIT CAPS, Take 1 tablet by mouth, Disp: , Rfl:     co-enzyme Q-10 30 mg, Take by mouth, Disp: , Rfl:     ELDERBERRY PO, Take by mouth, Disp: , Rfl:     LORazepam (ATIVAN) 0.5 mg tablet, Take 1 tablet (0.5 mg total) by mouth every 8 (eight) hours as needed for anxiety, Disp: 15 tablet, Rfl: 0    Multiple Vitamins-Minerals (MULTIVITAMIN ADULT EXTRA C PO), Take 1 tablet by mouth daily, Disp: , Rfl:     Multiple Vitamins-Minerals (ZINC PO), Take by mouth, Disp: , Rfl:     PARoxetine (PAXIL) 20 mg tablet, Take 1 tablet (20 mg total) by mouth daily, Disp: 90 tablet, Rfl: 1    triamterene-hydrochlorothiazide (MAXZIDE-25) 37.5-25 mg per tablet, Take 1 tablet by mouth daily, Disp: 90 tablet, Rfl: 3      Vikram Cortez PA-C

## 2024-05-20 ENCOUNTER — APPOINTMENT (OUTPATIENT)
Dept: LAB | Facility: MEDICAL CENTER | Age: 74
End: 2024-05-20
Payer: MEDICARE

## 2024-05-20 DIAGNOSIS — Z13.0 SCREENING FOR IRON DEFICIENCY ANEMIA: ICD-10-CM

## 2024-05-20 DIAGNOSIS — I10 HYPERTENSION, UNSPECIFIED TYPE: ICD-10-CM

## 2024-05-20 DIAGNOSIS — Z13.220 SCREENING FOR LIPID DISORDERS: ICD-10-CM

## 2024-05-20 DIAGNOSIS — Z13.29 SCREENING FOR THYROID DISORDER: ICD-10-CM

## 2024-05-20 LAB
ALBUMIN SERPL BCP-MCNC: 4 G/DL (ref 3.5–5)
ALP SERPL-CCNC: 54 U/L (ref 34–104)
ALT SERPL W P-5'-P-CCNC: 17 U/L (ref 7–52)
ANION GAP SERPL CALCULATED.3IONS-SCNC: 8 MMOL/L (ref 4–13)
AST SERPL W P-5'-P-CCNC: 13 U/L (ref 13–39)
BASOPHILS # BLD AUTO: 0.02 THOUSANDS/ÂΜL (ref 0–0.1)
BASOPHILS NFR BLD AUTO: 0 % (ref 0–1)
BILIRUB SERPL-MCNC: 0.53 MG/DL (ref 0.2–1)
BUN SERPL-MCNC: 23 MG/DL (ref 5–25)
CALCIUM SERPL-MCNC: 8.8 MG/DL (ref 8.4–10.2)
CHLORIDE SERPL-SCNC: 103 MMOL/L (ref 96–108)
CHOLEST SERPL-MCNC: 203 MG/DL
CO2 SERPL-SCNC: 29 MMOL/L (ref 21–32)
CREAT SERPL-MCNC: 0.9 MG/DL (ref 0.6–1.3)
EOSINOPHIL # BLD AUTO: 0.01 THOUSAND/ÂΜL (ref 0–0.61)
EOSINOPHIL NFR BLD AUTO: 0 % (ref 0–6)
ERYTHROCYTE [DISTWIDTH] IN BLOOD BY AUTOMATED COUNT: 13.2 % (ref 11.6–15.1)
GFR SERPL CREATININE-BSD FRML MDRD: 62 ML/MIN/1.73SQ M
GLUCOSE P FAST SERPL-MCNC: 95 MG/DL (ref 65–99)
HCT VFR BLD AUTO: 46 % (ref 34.8–46.1)
HDLC SERPL-MCNC: 74 MG/DL
HGB BLD-MCNC: 14.4 G/DL (ref 11.5–15.4)
IMM GRANULOCYTES # BLD AUTO: 0.08 THOUSAND/UL (ref 0–0.2)
IMM GRANULOCYTES NFR BLD AUTO: 1 % (ref 0–2)
LDLC SERPL CALC-MCNC: 101 MG/DL (ref 0–100)
LYMPHOCYTES # BLD AUTO: 2.94 THOUSANDS/ÂΜL (ref 0.6–4.47)
LYMPHOCYTES NFR BLD AUTO: 29 % (ref 14–44)
MCH RBC QN AUTO: 30.1 PG (ref 26.8–34.3)
MCHC RBC AUTO-ENTMCNC: 31.3 G/DL (ref 31.4–37.4)
MCV RBC AUTO: 96 FL (ref 82–98)
MONOCYTES # BLD AUTO: 0.75 THOUSAND/ÂΜL (ref 0.17–1.22)
MONOCYTES NFR BLD AUTO: 7 % (ref 4–12)
NEUTROPHILS # BLD AUTO: 6.45 THOUSANDS/ÂΜL (ref 1.85–7.62)
NEUTS SEG NFR BLD AUTO: 63 % (ref 43–75)
NRBC BLD AUTO-RTO: 0 /100 WBCS
PLATELET # BLD AUTO: 296 THOUSANDS/UL (ref 149–390)
PMV BLD AUTO: 11.5 FL (ref 8.9–12.7)
POTASSIUM SERPL-SCNC: 5 MMOL/L (ref 3.5–5.3)
PROT SERPL-MCNC: 6.6 G/DL (ref 6.4–8.4)
RBC # BLD AUTO: 4.78 MILLION/UL (ref 3.81–5.12)
SODIUM SERPL-SCNC: 140 MMOL/L (ref 135–147)
TRIGL SERPL-MCNC: 140 MG/DL
TSH SERPL DL<=0.05 MIU/L-ACNC: 2.9 UIU/ML (ref 0.45–4.5)
WBC # BLD AUTO: 10.25 THOUSAND/UL (ref 4.31–10.16)

## 2024-05-20 PROCEDURE — 36415 COLL VENOUS BLD VENIPUNCTURE: CPT

## 2024-05-20 PROCEDURE — 80061 LIPID PANEL: CPT

## 2024-05-20 PROCEDURE — 85025 COMPLETE CBC W/AUTO DIFF WBC: CPT

## 2024-05-20 PROCEDURE — 80053 COMPREHEN METABOLIC PANEL: CPT

## 2024-05-20 PROCEDURE — 84443 ASSAY THYROID STIM HORMONE: CPT

## 2024-07-02 ENCOUNTER — OFFICE VISIT (OUTPATIENT)
Dept: URGENT CARE | Facility: MEDICAL CENTER | Age: 74
End: 2024-07-02
Payer: MEDICARE

## 2024-07-02 ENCOUNTER — APPOINTMENT (OUTPATIENT)
Dept: RADIOLOGY | Facility: MEDICAL CENTER | Age: 74
End: 2024-07-02
Payer: MEDICARE

## 2024-07-02 VITALS
WEIGHT: 150 LBS | BODY MASS INDEX: 29.29 KG/M2 | TEMPERATURE: 98.3 F | DIASTOLIC BLOOD PRESSURE: 66 MMHG | SYSTOLIC BLOOD PRESSURE: 134 MMHG | OXYGEN SATURATION: 98 % | RESPIRATION RATE: 18 BRPM | HEART RATE: 84 BPM

## 2024-07-02 DIAGNOSIS — S83.91XA SPRAIN OF RIGHT KNEE, UNSPECIFIED LIGAMENT, INITIAL ENCOUNTER: ICD-10-CM

## 2024-07-02 DIAGNOSIS — S83.91XA SPRAIN OF RIGHT KNEE, UNSPECIFIED LIGAMENT, INITIAL ENCOUNTER: Primary | ICD-10-CM

## 2024-07-02 PROCEDURE — G0463 HOSPITAL OUTPT CLINIC VISIT: HCPCS | Performed by: PHYSICIAN ASSISTANT

## 2024-07-02 PROCEDURE — 99213 OFFICE O/P EST LOW 20 MIN: CPT | Performed by: PHYSICIAN ASSISTANT

## 2024-07-02 PROCEDURE — 73562 X-RAY EXAM OF KNEE 3: CPT

## 2024-07-02 NOTE — PATIENT INSTRUCTIONS
Sprain right knee  Rest, ice, elevate, over-the-counter pain medication as needed  Follow up with PCP in 3-5 days.  Proceed to  ER if symptoms worsen.Patient Education     Knee Sprain ED   General Information   You came to the Emergency Department (ED) for a knee sprain. This means you bent or twisted your knee too far. Inside your knee, you have tough bands of tissue, called ligaments, that hold the bones together. When you bent or twisted your knee too far, one or more of these ligaments stretched or tore. Now your knee is swollen and sore. You may have pain when you try to walk, put weight on your leg, or bend your knee.  You may be waiting on some test results. The staff will contact you if there are concerning results.  What care is needed at home?   Call your regular doctor to let them know you were in the ED. Make a follow-up appointment if you were told to.  Rest your knee. You can use crutches, if needed, to help keep the weight off of your leg.  Place an ice pack or a bag of frozen peas wrapped in a towel over the painful part. Never put ice right on the skin. Use ice every 1 to 2 hours for 10 to 15 minutes at a time. Use for the first 24 to 48 hours after your injury.  Wrap your knee with an elastic bandage to give your knee some support and help with swelling.  Prop your knee on pillows, keeping it raised above the level of your heart. This may help lessen pain and swelling.  You may want to take drugs like ibuprofen or naproxen for swelling and pain. These are nonsteroidal anti-inflammatory drugs (NSAIDS).  When your swelling and pain are better, slowly start to stretch your knee. Also start doing gentle activities again.  When do I need to call the doctor?   The pain or swelling gets worse.  Your foot or toes are numb or blue or grey in color.  Your knee is not stable or feels unsteady, like it might give out when you try to walk or put weight on it.  You have new or worsening symptoms.  Last  Reviewed Date   2021-01-04  Consumer Information Use and Disclaimer   This generalized information is a limited summary of diagnosis, treatment, and/or medication information. It is not meant to be comprehensive and should be used as a tool to help the user understand and/or assess potential diagnostic and treatment options. It does NOT include all information about conditions, treatments, medications, side effects, or risks that may apply to a specific patient. It is not intended to be medical advice or a substitute for the medical advice, diagnosis, or treatment of a health care provider based on the health care provider's examination and assessment of a patient’s specific and unique circumstances. Patients must speak with a health care provider for complete information about their health, medical questions, and treatment options, including any risks or benefits regarding use of medications. This information does not endorse any treatments or medications as safe, effective, or approved for treating a specific patient. [employer] and its affiliates disclaim any warranty or liability relating to this information or the use thereof. The use of this information is governed by the Terms of Use, available at https://www.woltersMOgeneuwer.com/en/know/clinical-effectiveness-terms   Copyright   Copyright © 2024 [employer] and its affiliates and/or licensors. All rights reserved.

## 2024-07-02 NOTE — PROGRESS NOTES
[facility]        NAME: Gina Hassan is a 73 y.o. female  : 1950    MRN: [id number]  DATE: 2024  TIME: 11:04 AM    Assessment and Plan   Sprain of right knee, unspecified ligament, initial encounter [S83.91XA]  1. Sprain of right knee, unspecified ligament, initial encounter  XR knee 3 vw right non injury            Patient Instructions     Sprain right knee  Rest, ice, elevate, over-the-counter pain medication as needed  Follow up with PCP in 3-5 days.  Proceed to  ER if symptoms worsen.    Chief Complaint     Chief Complaint   Patient presents with    Knee Pain     Pt. With right knee pain that began about a week ago and has worsened. Denies trauma.          History of Present Illness       73-year-old female who presents complaining of right knee pain.  Patient states that she has a history of arthritis of the left knee but over the last few days she has had pain to the right knee.  Patient does not recall history of trauma, fall, fevers, chills, rashes, swelling of leg, rashes.    Knee Pain         Review of Systems   Review of Systems   Constitutional: Negative.    HENT: Negative.     Eyes: Negative.    Respiratory: Negative.  Negative for cough, chest tightness, shortness of breath, wheezing and stridor.    Cardiovascular: Negative.  Negative for chest pain, palpitations and leg swelling.   Musculoskeletal:  Positive for arthralgias.         Current Medications       Current Outpatient Medications:     Ascorbic Acid (VITAMIN C PO), Take by mouth, Disp: , Rfl:     aspirin (ECOTRIN LOW STRENGTH) 81 mg EC tablet, Take 81 mg by mouth daily, Disp: , Rfl:     Calcium Carbonate-Vitamin D 600-200 MG-UNIT CAPS, Take 1 tablet by mouth, Disp: , Rfl:     co-enzyme Q-10 30 mg, Take by mouth, Disp: , Rfl:     ELDERBERRY PO, Take by mouth, Disp: , Rfl:     LORazepam (ATIVAN) 0.5 mg tablet, Take 1 tablet (0.5 mg total) by mouth every 8 (eight) hours as needed for anxiety, Disp: 15 tablet, Rfl: 0     Multiple Vitamins-Minerals (MULTIVITAMIN ADULT EXTRA C PO), Take 1 tablet by mouth daily, Disp: , Rfl:     Multiple Vitamins-Minerals (ZINC PO), Take by mouth, Disp: , Rfl:     PARoxetine (PAXIL) 20 mg tablet, Take 1 tablet (20 mg total) by mouth daily, Disp: 90 tablet, Rfl: 1    triamterene-hydrochlorothiazide (MAXZIDE-25) 37.5-25 mg per tablet, Take 1 tablet by mouth daily, Disp: 90 tablet, Rfl: 3    Current Allergies     Allergies as of 07/02/2024 - Reviewed 07/02/2024   Allergen Reaction Noted    Erythromycin base Other (See Comments) 08/28/2013    Penicillins Other (See Comments) 04/20/2012            The following portions of the patient's history were reviewed and updated as appropriate: allergies, current medications, past family history, past medical history, past social history, past surgical history and problem list.     Past Medical History:   Diagnosis Date    Anxiety     Cancer (HCC)     Uterine Ca and Skin    Hypertension        Past Surgical History:   Procedure Laterality Date    ABDOMINAL HYSTERECTOMY      with removal of both ovaries     HYSTERECTOMY         Family History   Problem Relation Age of Onset    Diabetes Mother     Heart disease Mother     Stroke Mother     Kidney cancer Brother          Medications have been verified.        Objective   /66   Pulse 84   Temp 98.3 °F (36.8 °C)   Resp 18   Wt 68 kg (150 lb)   SpO2 98%   BMI 29.29 kg/m²        Physical Exam     Physical Exam  Constitutional:       Appearance: She is well-developed.   HENT:      Head: Normocephalic and atraumatic.      Right Ear: External ear normal.      Left Ear: External ear normal.      Nose: Nose normal.      Mouth/Throat:      Mouth: Oropharynx is clear and moist.      Pharynx: No oropharyngeal exudate.   Cardiovascular:      Rate and Rhythm: Normal rate and regular rhythm.      Heart sounds: Normal heart sounds.   Pulmonary:      Effort: Pulmonary effort is normal. No respiratory distress.      Breath  sounds: Normal breath sounds. No wheezing or rales.   Chest:      Chest wall: No tenderness.   Musculoskeletal:      Cervical back: Normal range of motion and neck supple.      Right knee: No bony tenderness or crepitus. Tenderness present over the lateral joint line. No medial joint line, MCL, LCL, ACL, PCL or patellar tendon tenderness. No LCL laxity, MCL laxity, ACL laxity or PCL laxity. Normal alignment, normal meniscus and normal patellar mobility. Normal pulse.      Left knee: Normal.      Comments: Negative calf swelling, negative Homans' sign, negative erythema, negative warmth   Lymphadenopathy:      Cervical: No cervical adenopathy.

## 2024-07-03 ENCOUNTER — OFFICE VISIT (OUTPATIENT)
Dept: OBGYN CLINIC | Facility: CLINIC | Age: 74
End: 2024-07-03
Payer: MEDICARE

## 2024-07-03 VITALS — HEART RATE: 88 BPM | HEIGHT: 60 IN | OXYGEN SATURATION: 97 % | BODY MASS INDEX: 29.84 KG/M2 | WEIGHT: 152 LBS

## 2024-07-03 DIAGNOSIS — M17.11 PRIMARY OSTEOARTHRITIS OF RIGHT KNEE: ICD-10-CM

## 2024-07-03 PROCEDURE — 99213 OFFICE O/P EST LOW 20 MIN: CPT | Performed by: PHYSICIAN ASSISTANT

## 2024-07-03 NOTE — PATIENT INSTRUCTIONS
Patient Education     Strengthening Your Lower Body and Core   About this topic   Strengthening your muscles is an important part of an exercise program. Follow these steps for a good lower body strengthening program:  Schedule your program every other day. This will let your muscles rest and regain strength.  If you do work out every day, exercise different parts of your body. For example, work out your arms one day and the legs the next day.  Stop if you feel pain during an exercise. Do not overwork your muscles. This may cause harmful damage to your muscles.  Breathe out when you are doing the hard part of the exercise. Breathe in when you relax.  Muscle strain may happen if you do not follow the proper steps when doing these exercises. It may also happen if you try to do too many exercises right away.  General   Before starting with a program, ask your doctor if you are healthy enough to do these exercises. Your doctor may have you work with a , chiropractor, or physical therapist to make a safe exercise program to meet your needs.  Some of these exercises may cause lower back pain. If you have back problems like a compression fracture or a ruptured disc, doing some of these exercises could make your problem worse.  Some exercises can be done holding a small weight. You can use a can of soup or a hand weight. If the exercise gets too easy, use heavier weights or do the exercise more times.  Strengthening Exercises   Strengthening exercises keep your muscles firm and strong. Start by repeating each exercise 2 to 3 times. Work up to doing each exercise 10 times. Try to do the exercises 2 to 3 times each day. Do all exercises slowly.  Hip lifts ? Lie on your back with your knees bent and feet flat on the floor. Tighten your stomach muscles and lift your buttocks off the floor. Hold 3 to 5 seconds. Relax.  Straight leg raises lying down ? Lie on your back with one leg straight. Bend your other knee so the  foot is flat on the bed. Keeping your leg straight, lift the leg up to the level of your other knee. Lower it back down. Repeat with the other leg.  Abdominal crunches ? Lie on your back with both knees bent. Keep your feet flat on the floor. Place your hands in one of these positions. Try starting with the first position since it is the easiest. As you get better, use the other positions to make it harder.  Crunches with arms at sides.  Crunches with arms across chest.  Crunches with arms behind head. Be careful not to interlock your fingers behind your neck or head while doing crunches. This may add tension to your neck and cause strain.  Look at the ceiling. Tighten your belly muscles and lift your shoulders and upper back off the floor. Breathe out while you are doing this. Lower your shoulders to the floor. Breathe in while you are doing this. Relax your belly muscles all the way before starting another crunch.  Mini-squats ? Stand up straight in front of a counter and hold on with your hands. Have your feet spread about a foot apart. Bend your knees while keeping your back straight. Return to straight standing position. At first, start by just having a slight bend at the knee. To make it harder, bend your knees deeper or hold the position longer. Do not go any lower than 90 degrees.  Heel raises ? Hold onto a counter. Lift your heels up and rise up onto your toes. Lower yourself back down.  Arm and leg lifts on hands and knees ? Start on your hands and knees. With all of these exercises, keep your back as level as possible. If you are having trouble with this, you may want to put a small object on your back such as a book. If it falls off, you are not keeping your back level enough during the exercise.  Lift one arm up to shoulder level and hold. Lower it back down. Now, lift up the other arm and hold.  Lift one leg up and kick it straight out until it is in line with your back and hold. Lower it back down.  Now, lift up the other leg and hold.  Lift one arm and the OPPOSITE leg up at the same time and hold. Lower them down. Now, repeat using the other arm and leg. This is a very hard exercise. It may take time to be able to do this.               What will the results be?   Stronger muscles and bones  Better posture  Better balance and less risk for a fall or injury  Burn body fat and control weight  Better sleep at night and feel better during the day  Lower risk for health problems like arthritis, diabetes, osteoporosis, back pain, obesity, low mood  Helpful tips   Stay active and work out to keep your muscles strong and flexible.  Keep a healthy weight to avoid putting too much stress on your joints. Eat a healthy diet to keep your muscles healthy.  Be sure you do not hold your breath when exercising. This can raise your blood pressure. If you tend to hold your breath, try counting out loud when exercising. If any exercise bothers you, stop right away.  Try walking and swinging your arms at an easy pace for a few minutes to warm up your muscles. Do this again after exercising.  Doing exercises before a meal may be a good way to get into a routine.  If you are using weights, choose a weight that will allow you to repeat the exercise 10 times before resting. If you easily do 10 repeats, you may not be using enough weight. If you are not able to do 10 repeats, you are using too heavy of a weight.  Exercise may be slightly uncomfortable, but you should not have sharp pains. If you do get sharp pains, stop what you are doing. If the sharp pains continue, call your doctor.  Last Reviewed Date   2021-06-28  Consumer Information Use and Disclaimer   This generalized information is a limited summary of diagnosis, treatment, and/or medication information. It is not meant to be comprehensive and should be used as a tool to help the user understand and/or assess potential diagnostic and treatment options. It does NOT include  all information about conditions, treatments, medications, side effects, or risks that may apply to a specific patient. It is not intended to be medical advice or a substitute for the medical advice, diagnosis, or treatment of a health care provider based on the health care provider's examination and assessment of a patient’s specific and unique circumstances. Patients must speak with a health care provider for complete information about their health, medical questions, and treatment options, including any risks or benefits regarding use of medications. This information does not endorse any treatments or medications as safe, effective, or approved for treating a specific patient. [employer] and its affiliates disclaim any warranty or liability relating to this information or the use thereof. The use of this information is governed by the Terms of Use, available at https://www.woltersKODAuwer.com/en/know/clinical-effectiveness-terms   Copyright   Copyright © 2024 [employer] and its affiliates and/or licensors. All rights reserved.

## 2024-07-03 NOTE — PROGRESS NOTES
Assessment & Plan   Diagnoses and all orders for this visit:    Primary osteoarthritis of right knee  - Possible concurrent lateral meniscal injury  - Home exercise program  - Cortisone injection today  - Ice as needed  - Follow up with Dr. Dunbar in 3mo (pt prefers [place])          Subjective   Patient ID: Gina Hassan is a 73 y.o. female.    Vitals:    07/03/24 0956   Pulse: 88   SpO2: 97%     74yo female comes in for an evaluation of her right knee.  She has been having lateral knee pain for about a week with no specific injury.  No fevers or chills.  No clicking, catching, popping, or locking.  She is a patient of the practice for osteoarthritis in the contralateral knee.  The pain is dull in character, mild in severity, the pain occasionally radiates into the lateral lower leg and is not associated with numbness.            The following portions of the patient's history were reviewed and updated as appropriate: allergies, current medications, past family history, past medical history, past social history, past surgical history, and problem list.    Review of Systems  Ortho Exam  Past Medical History:   Diagnosis Date    Anxiety     Cancer (HCC)     Uterine Ca and Skin    Hypertension      Past Surgical History:   Procedure Laterality Date    ABDOMINAL HYSTERECTOMY      with removal of both ovaries     HYSTERECTOMY       Family History   Problem Relation Age of Onset    Diabetes Mother     Heart disease Mother     Stroke Mother     Kidney cancer Brother      Social History     Occupational History    Not on file   Tobacco Use    Smoking status: Never    Smokeless tobacco: Never   Vaping Use    Vaping status: Never Used   Substance and Sexual Activity    Alcohol use: No     Comment: socially     Drug use: Never    Sexual activity: Not on file       Review of Systems   Constitutional: Negative.    HENT: Negative.    Eyes: Negative.    Respiratory: Negative.    Cardiovascular: Negative.     Gastrointestinal: Negative.    Endocrine: Negative.    Genitourinary: Negative.    Musculoskeletal: As below..   Allergic/Immunologic: Negative.    Neurological: Negative.    Hematological: Negative.    Psychiatric/Behavioral: Negative.        Objective   Physical Exam    Constitutional: Awake, Alert, Oriented  Eyes: EOMI  Psych: Mood and affect appropriate  Heart: regular rate   Lungs: No audible wheezing  Abdomen: No guarding  Lymph: no lymphedema  right Knee:  Appearance  No swelling, discoloration, deformity, or ecchymosis  Normal, non-antalgic gait  Effusion  trace  Palpation  + Tenderness lateral joint line. and Otherwise no tenderness of the patella, patellar tendon, MCL, LCL, pes anserine, medial / lateral plateau  Non-tender fibula, lateral leg  ROM  Extension: 0 and Flexion: 130  Special Tests  Alfonso's Test negative, Lachman's Test negative, Anterior Drawer Test negative, Posterior Drawer Test negative, Valgus Stress Test negative, Varus Stress Test negative, and Patellar apprehension negative  SLR negative  Motor  normal 5/5 in all planes  NVI distally    Xrays:  I have personally reviewed pertinent films in PACS and my interpretation is no acute displaced fracture.

## 2024-08-01 ENCOUNTER — OFFICE VISIT (OUTPATIENT)
Dept: OBGYN CLINIC | Facility: CLINIC | Age: 74
End: 2024-08-01
Payer: MEDICARE

## 2024-08-01 VITALS
SYSTOLIC BLOOD PRESSURE: 130 MMHG | HEIGHT: 60 IN | BODY MASS INDEX: 29.84 KG/M2 | HEART RATE: 72 BPM | DIASTOLIC BLOOD PRESSURE: 74 MMHG | WEIGHT: 152 LBS

## 2024-08-01 DIAGNOSIS — M17.12 PRIMARY OSTEOARTHRITIS OF LEFT KNEE: Primary | ICD-10-CM

## 2024-08-01 PROCEDURE — 99213 OFFICE O/P EST LOW 20 MIN: CPT | Performed by: STUDENT IN AN ORGANIZED HEALTH CARE EDUCATION/TRAINING PROGRAM

## 2024-08-01 RX ORDER — DIPHENOXYLATE HYDROCHLORIDE AND ATROPINE SULFATE 2.5; .025 MG/1; MG/1
1 TABLET ORAL DAILY
COMMUNITY

## 2024-08-01 NOTE — PROGRESS NOTES
Date: 24  Gina Hassan   MRN# [id number]  : 1950      Chief Complaint: Left Knee Pain    Assessment and Plan:  The patient verbalized understanding of exam findings and treatment plan. We engaged in the shared decision-making process and treatment options were discussed at length with the patient. Surgical and conservative management discussed today along with risks and benefits. Patient was agreeable with the plan and all questions were answered to satisfaction.     Primary osteoarthritis of left knee  -WBAT  -Activity modification to limit strain or impact on the joint  -NSAIDs as needed  -Tylenol 1000mg up to three times daily as needed. Do not exceed 3000mg daily  -Supervised physical therapy. Script provided   -Home exercise program directed by PT  -Weight loss discussed   -Knee sleeve or brace for comfort  -Cane or walker recommended to offload joint  -Patient would like to proceed with visco injections at this time. Order placed to be completed with Vikram Cortez PA-C   -Patient may follow up prn for further evaluation and treatment        Subjective:     Knee Pain  Patient presents to the clinic today with complaints of left knee pain. This is evaluated as a personal injury. The pain began a few weeks ago. The pain is located medial, lateral and rates their pain as 7/10. She describes the symptoms as aching and sharp. Symptoms improve with rest. The symptoms are worse with activity. The knee has not given out or felt unstable. The patient cannot bend and straighten the knee fully. Treatment to date has been none.     Prior treatment:  NSAIDs Yes    Bracing No   Physical Therapy No   Cortisone Injections Yes   Viscosupplementation No       External Records Reviewed:   office notes    Orthopedic PMH  None    Orthopedic Surgical History  None    Estimated body mass index is 29.69 kg/m² as calculated from the following:    Height as of this encounter: 5' (1.524 m).    Weight as of this  "encounter: 68.9 kg (152 lb).    No results found for: \"HGBA1C\".   Lab Results   Component Value Date    EGFR 62 05/20/2024    EGFR 54 04/19/2023    EGFR 78 10/06/2021    CREATININE 0.90 05/20/2024    CREATININE 1.02 04/19/2023    CREATININE 0.76 10/06/2021        Allergy:  Allergies   Allergen Reactions    Erythromycin Base Other (See Comments)     Rash DIARRHEA    Penicillins Other (See Comments)     ALL MYCINS RASH       Medications:  All current active meds have been reviewed   Past Medical History:  Past Medical History:   Diagnosis Date    Anxiety     Cancer (HCC)     Uterine Ca and Skin    Hypertension      Past Surgical History:  Past Surgical History:   Procedure Laterality Date    ABDOMINAL HYSTERECTOMY      with removal of both ovaries     HYSTERECTOMY       Family History:  Family History   Problem Relation Age of Onset    Diabetes Mother     Heart disease Mother     Stroke Mother     Kidney cancer Brother      Social History:  Social History     Substance and Sexual Activity   Alcohol Use No    Comment: socially      Social History     Substance and Sexual Activity   Drug Use Never     Social History     Tobacco Use   Smoking Status Never   Smokeless Tobacco Never           Review of Systems:  General- denies fever/chills  HEENT- denies hearing loss or sore throat  Eyes- denies eye pain or visual disturbances, denies red eyes  Respiratory- denies cough or SOB  Cardio- denies chest pain or palpitations  GI- denies abdominal pain  Endocrine- denies urinary frequency  Urinary- denies pain with urination  Musculoskeletal- Negative except noted above  Skin- denies rashes or wounds  Neurological- denies dizziness or headache  Psychiatric- denies anxiety or difficulty concentrating      Objective:   BP Readings from Last 1 Encounters:   08/01/24 130/74      Wt Readings from Last 1 Encounters:   08/01/24 68.9 kg (152 lb)      Pulse Readings from Last 1 Encounters:   08/01/24 72        BMI: Estimated body mass " index is 29.69 kg/m² as calculated from the following:    Height as of this encounter: 5' (1.524 m).    Weight as of this encounter: 68.9 kg (152 lb).      Physical Exam  /74 (BP Location: Left arm, Patient Position: Sitting, Cuff Size: Adult)   Pulse 72   Ht 5' (1.524 m) Comment: verbal  Wt 68.9 kg (152 lb)   BMI 29.69 kg/m²   General/Constitutional: No apparent distress: well-nourished and well developed.  Eyes: normal ocular motion  Cardio: RRR, Normal S1S2, No m/r/g.   Lymphatic: No appreciable lymphadenopathy  Respiratory: Non-labored breathing, CTA b/l no w/c/r  Vascular: No edema, swelling or tenderness, except as noted in detailed exam. Extremities well perfused. No LE edema  Integumentary: No impressive skin lesions present, except as noted in detailed exam.  Neuro: No ataxia or tremors noted  Psych: Normal mood and affect, oriented to person, place and time. Appropriate affect.  Musculoskeletal: Normal, except as noted in detailed exam and in HPI.    Gait and Station:   normal and antalgic    Left Knee Exam:      Inspection:  normal color, temperature, turgor and moisture    Overall limb alignment: neutral    Effusion: no    ROM 0 to 130 without pain    Extensor Lag: Absent    Palpation: Medial and Lateral joint line tenderness to palpation    stable to AP translation at 90 deg    Coronal plane stable in full extension    Coronal plane stable in mid-flexion     Motor: 5/5 EHL/FHL/TA/GS/Qd/Hs    Vascular: Toes WWP with BCR    Sensory: SILT DP/SP/Beni/Saph/Ti      Images:  I personally reviewed relevant images in the PACS system and my interpretation is as follows:    X-rays of the left knee: moderate joint space narrowing, subchondral sclerosis, subchondral cysts, osteophyte formation. No fracture or dislocation.       Scribe Attestation      I,:  Alex Thurman PA-C am acting as a scribe while in the presence of the attending physician.:       I,:  Zander Dunbar MD personally performed the  services described in this documentation    as scribed in my presence.:             Zander Dunbar MD  Adult Reconstruction Specialist   [facility]

## 2024-08-01 NOTE — ASSESSMENT & PLAN NOTE
-WBAT  -Activity modification to limit strain or impact on the joint  -NSAIDs as needed  -Tylenol 1000mg up to three times daily as needed. Do not exceed 3000mg daily  -Supervised physical therapy. Script provided   -Home exercise program directed by PT  -Weight loss discussed   -Knee sleeve or brace for comfort  -Cane or walker recommended to offload joint  -Patient would like to proceed with visco injections at this time. Order placed to be completed with Vikram Cortez PA-C   -Patient may follow up prn for further evaluation and treatment

## 2024-08-05 ENCOUNTER — PROCEDURE VISIT (OUTPATIENT)
Dept: OBGYN CLINIC | Facility: CLINIC | Age: 74
End: 2024-08-05
Payer: MEDICARE

## 2024-08-05 VITALS
BODY MASS INDEX: 29.84 KG/M2 | DIASTOLIC BLOOD PRESSURE: 78 MMHG | SYSTOLIC BLOOD PRESSURE: 146 MMHG | HEIGHT: 60 IN | WEIGHT: 152 LBS | HEART RATE: 84 BPM

## 2024-08-05 DIAGNOSIS — G89.29 CHRONIC PAIN OF LEFT KNEE: ICD-10-CM

## 2024-08-05 DIAGNOSIS — M25.562 CHRONIC PAIN OF LEFT KNEE: ICD-10-CM

## 2024-08-05 DIAGNOSIS — M25.462 EFFUSION OF LEFT KNEE: ICD-10-CM

## 2024-08-05 DIAGNOSIS — M17.12 PRIMARY OSTEOARTHRITIS OF LEFT KNEE: Primary | ICD-10-CM

## 2024-08-05 PROCEDURE — 20610 DRAIN/INJ JOINT/BURSA W/O US: CPT | Performed by: PHYSICIAN ASSISTANT

## 2024-08-05 NOTE — PROGRESS NOTES
Orthopaedic Surgery - Office Note  Gina Hassan (73 y.o. female)   : 1950   MRN: [id number]  Encounter Date: 2024    Chief Complaint   Patient presents with    Left Knee - Follow-up     SYNOVISCO INJECTION          Assessment/Plan  Diagnoses and all orders for this visit:    Primary osteoarthritis of left knee    Chronic pain of left knee    Effusion of left knee    Other orders  -     Large joint arthrocentesis: L knee    Patient may ice the knee for comfort 20 minutes on 1 hour off 3 times a day.  She may use oral anti-inflammatory as needed.  She will follow all post Visco injection instructions.    As she does not wish to consider surgery, intermittent cortisone injections and Visco series may be considered for symptomatic pain relief.  She will continue knee brace for comfort and support    Patient reports that if she gets relief on the left side from today's Visco, she may wish to consider it for the contralateral side.  Patient does confirm she had a right knee cortisone injection performed on 7/3/2024 which did help a little but not completely.     Return if symptoms worsen or fail to improve.-I reviewed with the patient our next steps would be dependent on when the pain symptoms return.        History of Present Illness  This is a previous patient here for Synvisc 1 in the left knee.  She had a previous cortisone injection in May 2024 with incomplete relief of left knee pain.  She consulted with orthopedic surgeon and does not wish to consider surgery and a Visco injection series was ordered.  No new trauma is reported    Review of Systems  Pertinent items are noted in HPI.  All other systems were reviewed and are negative.    Physical Exam  /78 (BP Location: Left arm, Patient Position: Sitting, Cuff Size: Standard)   Pulse 84   Ht 5' (1.524 m)   Wt 68.9 kg (152 lb)   BMI 29.69 kg/m²   Cons: Appears well.  No apparent distress.  Psych: Alert. Oriented x3.  Mood and affect  "normal.    Left knee is without contraindication for proceeding with Synvisc 1 in the left knee.  There are no signs of infection        Studies Reviewed  LEFT KNEE     INDICATION:   Unilateral primary osteoarthritis, left knee.      COMPARISON:  None.     VIEWS:  XR KNEE 3 VW LEFT NON INJURY      FINDINGS:     There is no acute fracture or dislocation.     There is a small joint effusion.     Compared to prior study of 9/20/2021, there is mild narrowing of both the medial and lateral compartment spaces. Small osteophytes of the medial and lateral tibial plateau and femoral epicondyles. Patella is intact. Patellofemoral space preserved.     No lytic or blastic osseous lesion.     Soft tissues are unremarkable.     IMPRESSION:        No significant change from prior study.  Mild medial and lateral compartment degenerative changes as above.  Preserved patellofemoral space.  Small effusion.  No fracture        Electronically signed: 05/15/2024 02:33 PM Jethro Billingsley MD    Large joint arthrocentesis: L knee  Universal Protocol:  Consent: Verbal consent obtained.  Risks and benefits: risks, benefits and alternatives were discussed  Consent given by: patient  Time out: Immediately prior to procedure a \"time out\" was called to verify the correct patient, procedure, equipment, support staff and site/side marked as required.  Patient understanding: patient states understanding of the procedure being performed  Patient consent: the patient's understanding of the procedure matches consent given  Relevant documents: relevant documents present and verified  Test results: test results available and properly labeled  Site marked: the operative site was marked  Radiology Images displayed and confirmed. If images not available, report reviewed: imaging studies available  Patient identity confirmed: verbally with patient  Supporting Documentation  Indications: pain   Procedure Details  Location: knee - L knee  Preparation: Patient " was prepped and draped in the usual sterile fashion  Needle size: 18 G  Ultrasound guidance: no  Approach: anterolateral  Medications administered: 48 mg hylan 48 MG/6ML    Aspirate amount: 40 mL  Aspirate: clear and yellow  Patient tolerance: patient tolerated the procedure well with no immediate complications  Dressing:  Sterile dressing applied          Medical, Surgical, Family, and Social History  The patient's medical history, family history, and social history, were reviewed and updated as appropriate.    Past Medical History:   Diagnosis Date    Anxiety     Cancer (HCC)     Uterine Ca and Skin    Hypertension        Past Surgical History:   Procedure Laterality Date    ABDOMINAL HYSTERECTOMY      with removal of both ovaries     HYSTERECTOMY         Family History   Problem Relation Age of Onset    Diabetes Mother     Heart disease Mother     Stroke Mother     Kidney cancer Brother        Social History     Occupational History    Not on file   Tobacco Use    Smoking status: Never    Smokeless tobacco: Never   Vaping Use    Vaping status: Never Used   Substance and Sexual Activity    Alcohol use: No     Comment: socially     Drug use: Never    Sexual activity: Not on file       Allergies   Allergen Reactions    Erythromycin Base Other (See Comments)     Rash DIARRHEA    Penicillins Other (See Comments)     ALL MYCINS RASH           Current Outpatient Medications:     Ascorbic Acid (VITAMIN C PO), Take by mouth, Disp: , Rfl:     aspirin (ECOTRIN LOW STRENGTH) 81 mg EC tablet, Take 81 mg by mouth daily, Disp: , Rfl:     Calcium Carbonate-Vitamin D 600-200 MG-UNIT CAPS, Take 1 tablet by mouth, Disp: , Rfl:     co-enzyme Q-10 30 mg, Take by mouth, Disp: , Rfl:     ELDERBERRY PO, Take by mouth, Disp: , Rfl:     LORazepam (ATIVAN) 0.5 mg tablet, Take 1 tablet (0.5 mg total) by mouth every 8 (eight) hours as needed for anxiety, Disp: 15 tablet, Rfl: 0    Multiple Vitamins-Minerals (MULTIVITAMIN ADULT EXTRA C PO),  Take 1 tablet by mouth daily, Disp: , Rfl:     Multiple Vitamins-Minerals (ZINC PO), Take by mouth, Disp: , Rfl:     multivitamin (THERAGRAN) TABS, Take 1 tablet by mouth daily, Disp: , Rfl:     NON FORMULARY, Take 1 tablet by mouth 2 (two) times a day with meals CALCIUM CARBONATE-VITAMIN D (OS-EUSEBIA D) 500 MG-5 MCG (200 UNIT) PER TABLET, Disp: , Rfl:     PARoxetine (PAXIL) 20 mg tablet, Take 1 tablet (20 mg total) by mouth daily, Disp: 90 tablet, Rfl: 1    triamterene-hydrochlorothiazide (MAXZIDE-25) 37.5-25 mg per tablet, Take 1 tablet by mouth daily, Disp: 90 tablet, Rfl: 3      Vikram Cortez PA-C

## 2024-08-05 NOTE — PATIENT INSTRUCTIONS
Patient Education     Viscosupplementation   Why is this procedure done?   Your joints are where two bones meet. The ends of the bones are covered with a protective coating of cartilage. This helps them glide smoothly during movement. A fluid also helps the joint move more smoothly. With years of use, the cartilage may begin to wear away. This causes pain in the joints. This procedure is done to relieve the pain. A special fluid is used to help the bones glide more easily. It also acts like a shock absorber. This is most often done on the knee joints.  What will the results be?   Lubricates the joint  Less pain in the joints during movement or weight bearing  Improved joint mobility or movement  What happens before the procedure?   Your doctor may ask you to try other ways to treat osteoarthritis or OA, such as exercise, physical therapy, drugs for pain, applying hot compress, and losing weight.  Talk to your doctor about all the drugs you are taking. Be sure to include all prescription and over-the-counter (OTC) drugs, and herbal supplements. Tell the doctor about any drug allergy. Bring a list of drugs you take with you. Some drugs may interact with the injection.  What happens during the procedure?   Your doctor will clean the skin area where the injection will be given. You will be given a drug called local anesthesia. This will numb your skin and you will not feel any pain.  In some cases, your doctor might use imaging like x-ray or ultrasound to make sure they inject the right spot.  If you have excess fluid in your joint, your doctor may remove the fluid before beginning.  A needle will be used to inject the hyaluronic acid into the joint. Hyaluronic acid is a natural fluid in your body. It lubricates the joint to ease body movements.  The doctor will cover the injection site with a small bandage to prevent infection.  The procedure may only take a couple of minutes.  What happens after the procedure?   You  may feel slight pain, warmth, and swelling around the joint area.  You will be able to go home after the injection.  What care is needed at home?   Ask your doctor what you need to do when you go home. Make sure you ask questions if you do not understand what the doctor says. This way you will know what you need to do.  Place an ice pack or a bag of frozen peas wrapped in a towel over the painful part. Never put ice right on the skin. Do not leave the ice on more than 10 to 15 minutes at a time.  Rest for the first few days after the procedure. Avoid strenuous activities like heavy lifting, jogging, standing for a long time, and hard exercise.  What follow-up care is needed?   Your doctor may ask you to make visits to the office to check on your progress. Be sure to keep these visits. Your doctor may want you to have more injections.  What lifestyle changes are needed?   Ask your doctor about when you can go back to your normal activities like exercise or work.  What problems could happen?   Pain, redness, and swelling around the injection site  Infection of the joint  Fever  Allergic reaction  Itching  Rash  Bruising  Bleeding in the joint  No change in pain after injection  Last Reviewed Date   2021-03-30  Consumer Information Use and Disclaimer   This generalized information is a limited summary of diagnosis, treatment, and/or medication information. It is not meant to be comprehensive and should be used as a tool to help the user understand and/or assess potential diagnostic and treatment options. It does NOT include all information about conditions, treatments, medications, side effects, or risks that may apply to a specific patient. It is not intended to be medical advice or a substitute for the medical advice, diagnosis, or treatment of a health care provider based on the health care provider's examination and assessment of a patient’s specific and unique circumstances. Patients must speak with a health care  provider for complete information about their health, medical questions, and treatment options, including any risks or benefits regarding use of medications. This information does not endorse any treatments or medications as safe, effective, or approved for treating a specific patient. Up[employer] and its affiliates disclaim any warranty or liability relating to this information or the use thereof. The use of this information is governed by the Terms of Use, available at https://www.[employer].com/en/know/clinical-effectiveness-terms   Copyright   Copyright © 2024 [employer], [employer] and its affiliates and/or licensors. All rights reserved.

## 2024-08-06 ENCOUNTER — TELEPHONE (OUTPATIENT)
Age: 74
End: 2024-08-06

## 2024-08-06 DIAGNOSIS — M17.11 PRIMARY OSTEOARTHRITIS OF RIGHT KNEE: Primary | ICD-10-CM

## 2024-08-06 NOTE — TELEPHONE ENCOUNTER
Caller: Patient  Doctor/office: [facility]   CB#: [phone]       called to start auth/delivery for VISCO(Gel) injections.    Body Part: right knee   Pain Level (scale 1-10): 8  Date of last Gel Injection: na    Educated patient on Visco procedure. Auth team will review insurance and process the request appropriately. Patient will be contacted if the have any questions and when ready to schedule.

## 2024-08-07 ENCOUNTER — APPOINTMENT (EMERGENCY)
Dept: RADIOLOGY | Facility: HOSPITAL | Age: 74
End: 2024-08-07
Payer: MEDICARE

## 2024-08-07 ENCOUNTER — HOSPITAL ENCOUNTER (EMERGENCY)
Facility: HOSPITAL | Age: 74
Discharge: HOME | End: 2024-08-07
Payer: MEDICARE

## 2024-08-07 VITALS
RESPIRATION RATE: 16 BRPM | DIASTOLIC BLOOD PRESSURE: 68 MMHG | OXYGEN SATURATION: 100 % | SYSTOLIC BLOOD PRESSURE: 107 MMHG | HEART RATE: 72 BPM | TEMPERATURE: 97.7 F

## 2024-08-07 DIAGNOSIS — M25.461 EFFUSION OF RIGHT KNEE: Primary | ICD-10-CM

## 2024-08-07 DIAGNOSIS — M17.9 KNEE OSTEOARTHRITIS: ICD-10-CM

## 2024-08-07 LAB
BASOPHILS # BLD AUTO: 0.04 THOUSANDS/ÂΜL (ref 0–0.1)
BASOPHILS NFR BLD AUTO: 1 % (ref 0–1)
EOSINOPHIL # BLD AUTO: 0.08 THOUSAND/ÂΜL (ref 0–0.61)
EOSINOPHIL NFR BLD AUTO: 1 % (ref 0–6)
ERYTHROCYTE [DISTWIDTH] IN BLOOD BY AUTOMATED COUNT: 12.9 % (ref 11.6–15.1)
HCT VFR BLD AUTO: 42.1 % (ref 34.8–46.1)
HGB BLD-MCNC: 13.6 G/DL (ref 11.5–15.4)
IMM GRANULOCYTES # BLD AUTO: 0.03 THOUSAND/UL (ref 0–0.2)
IMM GRANULOCYTES NFR BLD AUTO: 0 % (ref 0–2)
LYMPHOCYTES # BLD AUTO: 2.5 THOUSANDS/ÂΜL (ref 0.6–4.47)
LYMPHOCYTES NFR BLD AUTO: 32 % (ref 14–44)
MCH RBC QN AUTO: 30.3 PG (ref 26.8–34.3)
MCHC RBC AUTO-ENTMCNC: 32.3 G/DL (ref 31.4–37.4)
MCV RBC AUTO: 94 FL (ref 82–98)
MONOCYTES # BLD AUTO: 0.65 THOUSAND/ÂΜL (ref 0.17–1.22)
MONOCYTES NFR BLD AUTO: 8 % (ref 4–12)
NEUTROPHILS # BLD AUTO: 4.57 THOUSANDS/ÂΜL (ref 1.85–7.62)
NEUTS SEG NFR BLD AUTO: 58 % (ref 43–75)
NRBC BLD AUTO-RTO: 0 /100 WBCS
PLATELET # BLD AUTO: 333 THOUSANDS/UL (ref 149–390)
PMV BLD AUTO: 10.1 FL (ref 8.9–12.7)
RBC # BLD AUTO: 4.49 MILLION/UL (ref 3.81–5.12)
WBC # BLD AUTO: 7.87 THOUSAND/UL (ref 4.31–10.16)

## 2024-08-07 PROCEDURE — 85025 COMPLETE CBC W/AUTO DIFF WBC: CPT

## 2024-08-07 PROCEDURE — 73560 X-RAY EXAM OF KNEE 1 OR 2: CPT

## 2024-08-07 PROCEDURE — 36415 COLL VENOUS BLD VENIPUNCTURE: CPT

## 2024-08-07 PROCEDURE — 99284 EMERGENCY DEPT VISIT MOD MDM: CPT

## 2024-08-07 RX ORDER — LIDOCAINE HYDROCHLORIDE 10 MG/ML
10 INJECTION, SOLUTION EPIDURAL; INFILTRATION; INTRACAUDAL; PERINEURAL ONCE
Status: COMPLETED | OUTPATIENT
Start: 2024-08-07 | End: 2024-08-07

## 2024-08-07 RX ADMIN — LIDOCAINE HYDROCHLORIDE 10 ML: 10 INJECTION, SOLUTION EPIDURAL; INFILTRATION; INTRACAUDAL at 16:19

## 2024-08-07 NOTE — ED PROVIDER NOTES
History  Chief Complaint   Patient presents with    Knee Pain     Patient having r knee pain/swelling. Has received injection and x ray of knee      73-year-old female with history of bilateral knee arthritis presenting for right knee pain.  Patient reports increased pain in swelling but the past 5 days.  No overlying redness, recent fever.  Has been able to ambulate.  Reports last steroid injection about 3 to 4 months ago by orthopedist which helped with the pain.  No other acute symptoms or pains.  Patient states that she came here to get fluid drawn from the knee effusion.        Knee Pain  Associated symptoms: no back pain and no fever        Prior to Admission Medications   Prescriptions Last Dose Informant Patient Reported? Taking?   Ascorbic Acid (VITAMIN C PO)   Yes No   Sig: Take by mouth   Calcium Carbonate-Vitamin D 600-200 MG-UNIT CAPS   Yes No   Sig: Take 1 tablet by mouth   ELDERBERRY PO   Yes No   Sig: Take by mouth   LORazepam (ATIVAN) 0.5 mg tablet   No No   Sig: Take 1 tablet (0.5 mg total) by mouth every 8 (eight) hours as needed for anxiety   Multiple Vitamins-Minerals (MULTIVITAMIN ADULT EXTRA C PO)   Yes No   Sig: Take 1 tablet by mouth daily   Multiple Vitamins-Minerals (ZINC PO)   Yes No   Sig: Take by mouth   NON FORMULARY   Yes No   Sig: Take 1 tablet by mouth 2 (two) times a day with meals CALCIUM CARBONATE-VITAMIN D (OS-EUSEBIA D) 500 MG-5 MCG (200 UNIT) PER TABLET   PARoxetine (PAXIL) 20 mg tablet   No No   Sig: Take 1 tablet (20 mg total) by mouth daily   aspirin (ECOTRIN LOW STRENGTH) 81 mg EC tablet   Yes No   Sig: Take 81 mg by mouth daily   co-enzyme Q-10 30 mg   Yes No   Sig: Take by mouth   multivitamin (THERAGRAN) TABS   Yes No   Sig: Take 1 tablet by mouth daily   triamterene-hydrochlorothiazide (MAXZIDE-25) 37.5-25 mg per tablet   No No   Sig: Take 1 tablet by mouth daily      Facility-Administered Medications: None       Past Medical History:   Diagnosis Date    Anxiety     Cancer  (HCC)     Uterine Ca and Skin    Hypertension        Past Surgical History:   Procedure Laterality Date    ABDOMINAL HYSTERECTOMY      with removal of both ovaries     HYSTERECTOMY         Family History   Problem Relation Age of Onset    Diabetes Mother     Heart disease Mother     Stroke Mother     Kidney cancer Brother      I have reviewed and agree with the history as documented.    E-Cigarette/Vaping    E-Cigarette Use Never User      E-Cigarette/Vaping Substances     Social History     Tobacco Use    Smoking status: Never    Smokeless tobacco: Never   Vaping Use    Vaping status: Never Used   Substance Use Topics    Alcohol use: No     Comment: socially     Drug use: Never        Review of Systems   Constitutional:  Negative for chills and fever.   Respiratory:  Negative for cough and shortness of breath.    Cardiovascular:  Negative for chest pain and palpitations.   Gastrointestinal:  Negative for abdominal pain and vomiting.   Musculoskeletal:  Negative for arthralgias and back pain.   Skin:  Negative for color change and rash.   Neurological:  Negative for seizures and syncope.   All other systems reviewed and are negative.      Physical Exam  ED Triage Vitals [08/07/24 1415]   Temperature Pulse Respirations Blood Pressure SpO2   97.7 °F (36.5 °C) 72 16 107/68 100 %      Temp src Heart Rate Source Patient Position - Orthostatic VS BP Location FiO2 (%)   -- -- -- -- --      Pain Score       --             Orthostatic Vital Signs  Vitals:    08/07/24 1415   BP: 107/68   Pulse: 72       Physical Exam  Vitals and nursing note reviewed.   Constitutional:       General: She is not in acute distress.     Appearance: She is well-developed.   HENT:      Head: Normocephalic and atraumatic.   Eyes:      Conjunctiva/sclera: Conjunctivae normal.   Cardiovascular:      Rate and Rhythm: Normal rate and regular rhythm.      Heart sounds: No murmur heard.  Pulmonary:      Effort: Pulmonary effort is normal. No respiratory  distress.      Breath sounds: Normal breath sounds.   Abdominal:      Palpations: Abdomen is soft.      Tenderness: There is no abdominal tenderness.   Musculoskeletal:         General: Swelling present. No tenderness, deformity or signs of injury.      Cervical back: Neck supple.      Comments: Right knee swelling without overlying erythema, drainage.  No significant tenderness to palpation.   Skin:     General: Skin is warm and dry.      Capillary Refill: Capillary refill takes less than 2 seconds.   Neurological:      Mental Status: She is alert.   Psychiatric:         Mood and Affect: Mood normal.         ED Medications  Medications   lidocaine (PF) (XYLOCAINE-MPF) 1 % injection 10 mL (10 mL Infiltration Given by Other 8/7/24 1619)       Diagnostic Studies  Results Reviewed       Procedure Component Value Units Date/Time    CBC and differential [[id number]] Collected: 08/07/24 1416    Lab Status: Final result Specimen: Blood from Arm, Left Updated: 08/07/24 1424     WBC 7.87 Thousand/uL      RBC 4.49 Million/uL      Hemoglobin 13.6 g/dL      Hematocrit 42.1 %      MCV 94 fL      MCH 30.3 pg      MCHC 32.3 g/dL      RDW 12.9 %      MPV 10.1 fL      Platelets 333 Thousands/uL      nRBC 0 /100 WBCs      Segmented % 58 %      Immature Grans % 0 %      Lymphocytes % 32 %      Monocytes % 8 %      Eosinophils Relative 1 %      Basophils Relative 1 %      Absolute Neutrophils 4.57 Thousands/µL      Absolute Immature Grans 0.03 Thousand/uL      Absolute Lymphocytes 2.50 Thousands/µL      Absolute Monocytes 0.65 Thousand/µL      Eosinophils Absolute 0.08 Thousand/µL      Basophils Absolute 0.04 Thousands/µL                    XR knee 1 or 2 views right   Final Result by Obinna Bautista MD (08/07 4428)      No acute osseous abnormality.            Workstation performed: [id number]               Procedures  Arthrocentesis    Date/Time: 8/7/2024 4:16 PM    Performed by: Rivera Stout MD  Authorized by: Rivera Stout MD   Universal Protocol:  Procedure performed by:  Consent: Verbal consent obtained.  Risks and benefits: risks, benefits and alternatives were discussed  Consent given by: patient  Patient understanding: patient states understanding of the procedure being performed  Patient identity confirmed: verbally with patient    Location:  ED  Indications:  Joint swelling  Local anesthesia used?: Yes    Local anesthetic:  Lidocaine 1% without epinephrine  Needle size:  18 G  Approach:  Superior  Aspirate:  Clear  Lidocaine 1% amount (ml):  5  Patient tolerance:  Patient tolerated the procedure well with no immediate complications   20cc synovial fluid withdrawn         ED Course  ED Course as of 08/07/24 1632   Wed Aug 07, 2024   1511 73-year-old female with history of bilateral knee arthritis presenting for right knee pain.  Patient reports increased pain in swelling but the past 5 days.  No overlying redness, recent fever.  Has been able to ambulate.  Reports last steroid injection about 3 to 4 months ago by orthopedist which helped with the pain.  No other acute symptoms or pains.  Patient states that she came here to get fluid drawn from the knee effusion.   1631 Right knee arthrocentesis performed draining about 20 mL of clear synovial fluid.  Placed in Ace wrap for comfort.  Plan to discharge home in stable condition.  Advised to follow-up with orthopedist for further evaluation and management of right knee osteoarthritis.  Return to ED precautions for septic arthritis provided.                                       Medical Decision Making  See ED course.    Amount and/or Complexity of Data Reviewed  Labs: ordered.  Radiology: ordered.    Risk  Prescription drug management.          Disposition  Final diagnoses:   Effusion of right knee   Knee osteoarthritis     Time reflects when diagnosis was documented in both MDM as applicable and the Disposition within this note       Time User Action Codes Description Comment     8/7/2024  4:14 PM Rivera Stout [M25.461] Effusion of right knee     8/7/2024  4:14 PM Rivera Stout [M17.9] Knee osteoarthritis           ED Disposition       ED Disposition   Discharge    Condition   Stable    Date/Time   Wed Aug 7, 2024  4:15 PM    Comment   Gina Hassan discharge to home/self care.                   Follow-up Information       Follow up With Specialties Details Why Contact Info    Your orthopedist  Schedule an appointment as soon as possible for a visit               Discharge Medication List as of 8/7/2024  4:16 PM        CONTINUE these medications which have NOT CHANGED    Details   Ascorbic Acid (VITAMIN C PO) Take by mouth, Historical Med      aspirin (ECOTRIN LOW STRENGTH) 81 mg EC tablet Take 81 mg by mouth daily, Historical Med      Calcium Carbonate-Vitamin D 600-200 MG-UNIT CAPS Take 1 tablet by mouth, Historical Med      co-enzyme Q-10 30 mg Take by mouth, Historical Med      ELDERBERRY PO Take by mouth, Historical Med      LORazepam (ATIVAN) 0.5 mg tablet Take 1 tablet (0.5 mg total) by mouth every 8 (eight) hours as needed for anxiety, Starting Wed 5/15/2024, Normal      Multiple Vitamins-Minerals (MULTIVITAMIN ADULT EXTRA C PO) Take 1 tablet by mouth daily, Historical Med      Multiple Vitamins-Minerals (ZINC PO) Take by mouth, Historical Med      multivitamin (THERAGRAN) TABS Take 1 tablet by mouth daily, Historical Med      NON FORMULARY Take 1 tablet by mouth 2 (two) times a day with meals CALCIUM CARBONATE-VITAMIN D (OS-EUSEBIA D) 500 MG-5 MCG (200 UNIT) PER TABLET, Historical Med      PARoxetine (PAXIL) 20 mg tablet Take 1 tablet (20 mg total) by mouth daily, Starting Mon 5/13/2024, Normal      triamterene-hydrochlorothiazide (MAXZIDE-25) 37.5-25 mg per tablet Take 1 tablet by mouth daily, Starting Mon 5/13/2024, Normal           No discharge procedures on file.    PDMP Review       None             ED Provider  Attending physically available and evaluated Gina Hassan. I  managed the patient along with the ED Attending.    Electronically Signed by           Rivera Stout MD  08/07/24 9473

## 2024-08-07 NOTE — ED PROCEDURE NOTE
Procedure  POC MSK/Soft Tissue US    Date/Time: 8/7/2024 3:21 PM    Performed by: Roland Tavares MD  Authorized by: Roland Tavares MD    Patient location:  Bedside  Performed by:  Resident  Other Assisting Provider: Yes (comment)    Procedure:     Performed: soft tissue ultrasound and musculoskeletal ultrasound    Procedure details:     Exam Type:  Diagnostic    Longitudinal view:  Obtained    Transverse view:  Obtained    Image quality: limited diagnostic      Image availability:  Images available in PACS  Soft tissue ultrasound:     Soft tissue indications: swelling      Anatomic location:  Lower extremity  MSK ultrasound:     MSK indications: swelling      MSK assessment of:  Muscle, tendon and joint    MSK extremities evaluated: left lower extremity      Fluid Collection: Present (comment on size)      Joint Effusion: Present (comment on size)    Interpretation:     Soft tissue impressions: indeterminate      MSK impressions: abnormal ultrasound (see above)    Comments:      Dr. Girish Whitaker MD  08/07/24 6446

## 2024-08-07 NOTE — DISCHARGE INSTRUCTIONS
Follow-up with your orthopedist for further management of your right knee osteoarthritis and effusion.    Return to ED if signs of infection develop including significant knee pain with minimal motion, fevers, increased redness and swelling of the right knee.

## 2024-08-08 NOTE — ED ATTENDING ATTESTATION
"8/7/2024  I, Everardo Nixon MD, saw and evaluated the patient. I have discussed the patient with the resident/non-physician practitioner and agree with the resident's/non-physician practitioner's findings, Plan of Care, and MDM as documented in the resident's/non-physician practitioner's note, except where noted. All available labs and Radiology studies were reviewed.  I was present for key portions of any procedure(s) performed by the resident/non-physician practitioner and I was immediately available to provide assistance.       At this point I agree with the current assessment done in the Emergency Department.  I have conducted an independent evaluation of this patient a history and physical is as follows:    HPI: Patient has a history of osteoarthritis and recurrent episodes of joint effusions that previously needed to be joint/drained in the past.  Presents again with persistence of right knee pain and is requesting arthrocentesis/drainage of knee today in the emergency department.  Denies any fevers, chills, limited range of motion.  No clicking sensation or \"giving out of the knee.  No trauma to the knee.  No overlying redness or erythema.    Physical Exam  Vitals and nursing note reviewed.   Constitutional:       General: She is not in acute distress.     Appearance: She is well-developed.   HENT:      Head: Normocephalic and atraumatic.   Eyes:      Conjunctiva/sclera: Conjunctivae normal.   Cardiovascular:      Rate and Rhythm: Normal rate and regular rhythm.      Heart sounds: No murmur heard.  Pulmonary:      Effort: Pulmonary effort is normal. No respiratory distress.      Breath sounds: Normal breath sounds.   Abdominal:      Palpations: Abdomen is soft.      Tenderness: There is no abdominal tenderness.   Musculoskeletal:         General: Swelling present. No tenderness or signs of injury.      Cervical back: Neck supple.      Comments: Mild swelling appreciated on palpation of the right " knee.   Skin:     General: Skin is warm and dry.      Capillary Refill: Capillary refill takes less than 2 seconds.      Coloration: Skin is not jaundiced.      Findings: No bruising, erythema or lesion.   Neurological:      Mental Status: She is alert.   Psychiatric:         Mood and Affect: Mood normal.       RIGHT KNEE:  Erythema: no  Swelling: no  Increased Warmth: no  Tenderness: none  Flexion: intact  Extension: intact  Lachman's: negative  Drawer: negative  Varus laxity: negative  Valgus laxity: negative  [facility]: negative     DDX including but not limited to: arthritis, bursitis, tendinitis, sprain, strain, fracture, meniscal tear, cellulitis, osteomyelitis, gout, Lyme disease, Baker's cyst, rheumatologic process; doubt septic arthritis or DVT or arterial occlusion.     Based off of x-ray imaging as well as patient's history up to this point as noted in the resident documentation, I have higher suspicion that this is a joint effusion in the setting of osteoarthritis.  Bedside arthrocentesis is conducted by procedure note in resident documentation as well as ultrasonography was notable for aspiration of approximately 20 cc of straw like fluid.  Based off of it being most likely in the nature of the reaccumulation/reexacerbation setting of patient's osteoarthritis, deferred on sending fluid analysis for testing.  Given duration of arthrocentesis procedure, patient was counseled to follow-up with their primary orthopedic provider for further evaluation and assessment of knee effusion and repeat arthrocentesis/injection therapy as tolerated for management of symptoms.  Strict return precautions as well as red flag symptoms were discussed at length at bedside prior to discharge.  Patient was ambulatory at baseline level prior to removal from emergency department.    ED Course         Critical Care Time  Procedures

## 2024-08-09 DIAGNOSIS — M17.11 PRIMARY OSTEOARTHRITIS OF RIGHT KNEE: Primary | ICD-10-CM

## 2024-08-12 ENCOUNTER — OFFICE VISIT (OUTPATIENT)
Dept: OBGYN CLINIC | Facility: CLINIC | Age: 74
End: 2024-08-12
Payer: MEDICARE

## 2024-08-12 VITALS
WEIGHT: 152 LBS | BODY MASS INDEX: 29.84 KG/M2 | SYSTOLIC BLOOD PRESSURE: 149 MMHG | HEART RATE: 75 BPM | HEIGHT: 60 IN | DIASTOLIC BLOOD PRESSURE: 88 MMHG

## 2024-08-12 DIAGNOSIS — G89.29 CHRONIC PAIN OF RIGHT KNEE: ICD-10-CM

## 2024-08-12 DIAGNOSIS — M17.11 PRIMARY OSTEOARTHRITIS OF RIGHT KNEE: Primary | ICD-10-CM

## 2024-08-12 DIAGNOSIS — M25.561 CHRONIC PAIN OF RIGHT KNEE: ICD-10-CM

## 2024-08-12 PROCEDURE — 20610 DRAIN/INJ JOINT/BURSA W/O US: CPT | Performed by: PHYSICIAN ASSISTANT

## 2024-08-12 NOTE — PATIENT INSTRUCTIONS
Patient Education     Viscosupplementation   Why is this procedure done?   Your joints are where two bones meet. The ends of the bones are covered with a protective coating of cartilage. This helps them glide smoothly during movement. A fluid also helps the joint move more smoothly. With years of use, the cartilage may begin to wear away. This causes pain in the joints. This procedure is done to relieve the pain. A special fluid is used to help the bones glide more easily. It also acts like a shock absorber. This is most often done on the knee joints.  What will the results be?   Lubricates the joint  Less pain in the joints during movement or weight bearing  Improved joint mobility or movement  What happens before the procedure?   Your doctor may ask you to try other ways to treat osteoarthritis or OA, such as exercise, physical therapy, drugs for pain, applying hot compress, and losing weight.  Talk to your doctor about all the drugs you are taking. Be sure to include all prescription and over-the-counter (OTC) drugs, and herbal supplements. Tell the doctor about any drug allergy. Bring a list of drugs you take with you. Some drugs may interact with the injection.  What happens during the procedure?   Your doctor will clean the skin area where the injection will be given. You will be given a drug called local anesthesia. This will numb your skin and you will not feel any pain.  In some cases, your doctor might use imaging like x-ray or ultrasound to make sure they inject the right spot.  If you have excess fluid in your joint, your doctor may remove the fluid before beginning.  A needle will be used to inject the hyaluronic acid into the joint. Hyaluronic acid is a natural fluid in your body. It lubricates the joint to ease body movements.  The doctor will cover the injection site with a small bandage to prevent infection.  The procedure may only take a couple of minutes.  What happens after the procedure?   You  may feel slight pain, warmth, and swelling around the joint area.  You will be able to go home after the injection.  What care is needed at home?   Ask your doctor what you need to do when you go home. Make sure you ask questions if you do not understand what the doctor says. This way you will know what you need to do.  Place an ice pack or a bag of frozen peas wrapped in a towel over the painful part. Never put ice right on the skin. Do not leave the ice on more than 10 to 15 minutes at a time.  Rest for the first few days after the procedure. Avoid strenuous activities like heavy lifting, jogging, standing for a long time, and hard exercise.  What follow-up care is needed?   Your doctor may ask you to make visits to the office to check on your progress. Be sure to keep these visits. Your doctor may want you to have more injections.  What lifestyle changes are needed?   Ask your doctor about when you can go back to your normal activities like exercise or work.  What problems could happen?   Pain, redness, and swelling around the injection site  Infection of the joint  Fever  Allergic reaction  Itching  Rash  Bruising  Bleeding in the joint  No change in pain after injection  Last Reviewed Date   2021-03-30  Consumer Information Use and Disclaimer   This generalized information is a limited summary of diagnosis, treatment, and/or medication information. It is not meant to be comprehensive and should be used as a tool to help the user understand and/or assess potential diagnostic and treatment options. It does NOT include all information about conditions, treatments, medications, side effects, or risks that may apply to a specific patient. It is not intended to be medical advice or a substitute for the medical advice, diagnosis, or treatment of a health care provider based on the health care provider's examination and assessment of a patient’s specific and unique circumstances. Patients must speak with a health care  provider for complete information about their health, medical questions, and treatment options, including any risks or benefits regarding use of medications. This information does not endorse any treatments or medications as safe, effective, or approved for treating a specific patient. [employer] and its affiliates disclaim any warranty or liability relating to this information or the use thereof. The use of this information is governed by the Terms of Use, available at https://www.w[employer]ters[employer]isuwer.com/en/know/clinical-effectiveness-terms   Copyright   Copyright © 2024 [employer] and its affiliates and/or licensors. All rights reserved.

## 2024-08-12 NOTE — PROGRESS NOTES
"Orthopaedic Surgery - Office Note  Gina Hassan (73 y.o. female)   : 1950   MRN: [id number]  Encounter Date: 2024    Chief Complaint   Patient presents with    Right Knee - Pain     EUFFLEXA INJECTION #1         Assessment/Plan  Diagnoses and all orders for this visit:    Primary osteoarthritis of right knee    Chronic pain of right knee    Other orders  -     Large joint arthrocentesis    Patient will follow all post Visco instructions.  She will ice the knee for comfort.  She will return in 1 week for repeat evaluation at which time we will proceed with Visco #2     Return for Recheck in one week for visco # 2 right knee.        History of Present Illness  Patient is here for Euflexxa No. 1 in the right knee.  Patient reports she would prefer the 3 injection series as scheduled.  She reports the swelling was so bad recently she went to the emergency department where they \"tried 8 times even with ultrasound and could not get the fluid out\"    Review of Systems  Pertinent items are noted in HPI.  All other systems were reviewed and are negative.    Physical Exam  /88 (BP Location: Left arm, Patient Position: Sitting, Cuff Size: Standard)   Pulse 75   Ht 5' (1.524 m)   Wt 68.9 kg (152 lb)   BMI 29.69 kg/m²   Cons: Appears well.  No apparent distress.  Psych: Alert. Oriented x3.  Mood and affect normal.  Right knee is without contraindication for proceeding to  Start Visco injection series.  She has trace effusion in the knee on physical exam today.  She is adamant she would like me to try and aspirate it.          Studies Reviewed  Right knee x-rays were reviewed in the office today.   Read     FINDINGS:     No acute fracture or dislocation.     Small joint effusion.     Tricompartment mild narrowing with small osteophytes; osteoarthritis.     No lytic or blastic osseous lesion.     Unremarkable soft tissues.     IMPRESSION:     No acute osseous abnormality.           Workstation performed: " "[id number]  Interpreted by Roland Nixon MD on 8/7/2024  8:36 PM  Narrative & Impression  XR KNEE 1 OR 2 VW RIGHT     INDICATION: right knee effusion.     COMPARISON: 7/2/2024     FINDINGS:     No acute fracture or dislocation.     Small joint effusion.     Tricompartment mild narrowing with small osteophytes; osteoarthritis.     No lytic or blastic osseous lesion.     Unremarkable soft tissues.     IMPRESSION:     No acute osseous abnormality.           Workstation performed: [id number]  Large joint arthrocentesis: R knee  Universal Protocol:  Consent: Verbal consent obtained.  Risks and benefits: risks, benefits and alternatives were discussed  Consent given by: patient  Time out: Immediately prior to procedure a \"time out\" was called to verify the correct patient, procedure, equipment, support staff and site/side marked as required.  Patient understanding: patient states understanding of the procedure being performed  Patient consent: the patient's understanding of the procedure matches consent given  Relevant documents: relevant documents present and verified  Test results: test results available and properly labeled  Site marked: the operative site was marked  Radiology Images displayed and confirmed. If images not available, report reviewed: imaging studies available  Patient identity confirmed: verbally with patient  Supporting Documentation  Indications: pain   Procedure Details  Location: knee - R knee  Preparation: Patient was prepped and draped in the usual sterile fashion  Needle size: 18 G  Ultrasound guidance: no  Approach: lateral    Aspirate amount: 5 mL  Aspirate: yellow and blood-tinged  Patient tolerance: patient tolerated the procedure well with no immediate complications  Dressing:  Sterile dressing applied          Medical, Surgical, Family, and Social History  The patient's medical history, family history, and social history, were reviewed and updated as " appropriate.    Past Medical History:   Diagnosis Date    Anxiety     Cancer (HCC)     Uterine Ca and Skin    Hypertension        Past Surgical History:   Procedure Laterality Date    ABDOMINAL HYSTERECTOMY      with removal of both ovaries     HYSTERECTOMY         Family History   Problem Relation Age of Onset    Diabetes Mother     Heart disease Mother     Stroke Mother     Kidney cancer Brother        Social History     Occupational History    Not on file   Tobacco Use    Smoking status: Never    Smokeless tobacco: Never   Vaping Use    Vaping status: Never Used   Substance and Sexual Activity    Alcohol use: No     Comment: socially     Drug use: Never    Sexual activity: Not on file       Allergies   Allergen Reactions    Erythromycin Base Other (See Comments)     Rash DIARRHEA    Penicillins Other (See Comments)     ALL MYCINS RASH           Current Outpatient Medications:     Ascorbic Acid (VITAMIN C PO), Take by mouth, Disp: , Rfl:     aspirin (ECOTRIN LOW STRENGTH) 81 mg EC tablet, Take 81 mg by mouth daily, Disp: , Rfl:     Calcium Carbonate-Vitamin D 600-200 MG-UNIT CAPS, Take 1 tablet by mouth, Disp: , Rfl:     co-enzyme Q-10 30 mg, Take by mouth, Disp: , Rfl:     ELDERBERRY PO, Take by mouth, Disp: , Rfl:     LORazepam (ATIVAN) 0.5 mg tablet, Take 1 tablet (0.5 mg total) by mouth every 8 (eight) hours as needed for anxiety, Disp: 15 tablet, Rfl: 0    Multiple Vitamins-Minerals (MULTIVITAMIN ADULT EXTRA C PO), Take 1 tablet by mouth daily, Disp: , Rfl:     Multiple Vitamins-Minerals (ZINC PO), Take by mouth, Disp: , Rfl:     multivitamin (THERAGRAN) TABS, Take 1 tablet by mouth daily, Disp: , Rfl:     NON FORMULARY, Take 1 tablet by mouth 2 (two) times a day with meals CALCIUM CARBONATE-VITAMIN D (OS-EUSEBIA D) 500 MG-5 MCG (200 UNIT) PER TABLET, Disp: , Rfl:     PARoxetine (PAXIL) 20 mg tablet, Take 1 tablet (20 mg total) by mouth daily, Disp: 90 tablet, Rfl: 1    triamterene-hydrochlorothiazide  (MAXZIDE-25) 37.5-25 mg per tablet, Take 1 tablet by mouth daily, Disp: 90 tablet, Rfl: 3      Vikram Cortez PA-C

## 2024-08-19 ENCOUNTER — PROCEDURE VISIT (OUTPATIENT)
Dept: OBGYN CLINIC | Facility: CLINIC | Age: 74
End: 2024-08-19
Payer: MEDICARE

## 2024-08-19 VITALS
HEIGHT: 60 IN | HEART RATE: 78 BPM | SYSTOLIC BLOOD PRESSURE: 133 MMHG | BODY MASS INDEX: 29.84 KG/M2 | WEIGHT: 152 LBS | DIASTOLIC BLOOD PRESSURE: 73 MMHG

## 2024-08-19 DIAGNOSIS — M17.11 PRIMARY OSTEOARTHRITIS OF RIGHT KNEE: ICD-10-CM

## 2024-08-19 DIAGNOSIS — G89.29 CHRONIC PAIN OF RIGHT KNEE: Primary | ICD-10-CM

## 2024-08-19 DIAGNOSIS — M25.561 CHRONIC PAIN OF RIGHT KNEE: Primary | ICD-10-CM

## 2024-08-19 PROCEDURE — 20610 DRAIN/INJ JOINT/BURSA W/O US: CPT | Performed by: PHYSICIAN ASSISTANT

## 2024-08-19 RX ORDER — HYALURONATE SODIUM 10 MG/ML
20 SYRINGE (ML) INTRAARTICULAR
Status: COMPLETED | OUTPATIENT
Start: 2024-08-19 | End: 2024-08-19

## 2024-08-19 RX ADMIN — Medication 20 MG: at 15:00

## 2024-08-19 NOTE — PATIENT INSTRUCTIONS
Patient Education     Viscosupplementation   Why is this procedure done?   Your joints are where two bones meet. The ends of the bones are covered with a protective coating of cartilage. This helps them glide smoothly during movement. A fluid also helps the joint move more smoothly. With years of use, the cartilage may begin to wear away. This causes pain in the joints. This procedure is done to relieve the pain. A special fluid is used to help the bones glide more easily. It also acts like a shock absorber. This is most often done on the knee joints.  What will the results be?   Lubricates the joint  Less pain in the joints during movement or weight bearing  Improved joint mobility or movement  What happens before the procedure?   Your doctor may ask you to try other ways to treat osteoarthritis or OA, such as exercise, physical therapy, drugs for pain, applying hot compress, and losing weight.  Talk to your doctor about all the drugs you are taking. Be sure to include all prescription and over-the-counter (OTC) drugs, and herbal supplements. Tell the doctor about any drug allergy. Bring a list of drugs you take with you. Some drugs may interact with the injection.  What happens during the procedure?   Your doctor will clean the skin area where the injection will be given. You will be given a drug called local anesthesia. This will numb your skin and you will not feel any pain.  In some cases, your doctor might use imaging like x-ray or ultrasound to make sure they inject the right spot.  If you have excess fluid in your joint, your doctor may remove the fluid before beginning.  A needle will be used to inject the hyaluronic acid into the joint. Hyaluronic acid is a natural fluid in your body. It lubricates the joint to ease body movements.  The doctor will cover the injection site with a small bandage to prevent infection.  The procedure may only take a couple of minutes.  What happens after the procedure?   You  may feel slight pain, warmth, and swelling around the joint area.  You will be able to go home after the injection.  What care is needed at home?   Ask your doctor what you need to do when you go home. Make sure you ask questions if you do not understand what the doctor says. This way you will know what you need to do.  Place an ice pack or a bag of frozen peas wrapped in a towel over the painful part. Never put ice right on the skin. Do not leave the ice on more than 10 to 15 minutes at a time.  Rest for the first few days after the procedure. Avoid strenuous activities like heavy lifting, jogging, standing for a long time, and hard exercise.  What follow-up care is needed?   Your doctor may ask you to make visits to the office to check on your progress. Be sure to keep these visits. Your doctor may want you to have more injections.  What lifestyle changes are needed?   Ask your doctor about when you can go back to your normal activities like exercise or work.  What problems could happen?   Pain, redness, and swelling around the injection site  Infection of the joint  Fever  Allergic reaction  Itching  Rash  Bruising  Bleeding in the joint  No change in pain after injection  Last Reviewed Date   2021-03-30  Consumer Information Use and Disclaimer   This generalized information is a limited summary of diagnosis, treatment, and/or medication information. It is not meant to be comprehensive and should be used as a tool to help the user understand and/or assess potential diagnostic and treatment options. It does NOT include all information about conditions, treatments, medications, side effects, or risks that may apply to a specific patient. It is not intended to be medical advice or a substitute for the medical advice, diagnosis, or treatment of a health care provider based on the health care provider's examination and assessment of a patient’s specific and unique circumstances. Patients must speak with a health care  provider for complete information about their health, medical questions, and treatment options, including any risks or benefits regarding use of medications. This information does not endorse any treatments or medications as safe, effective, or approved for treating a specific patient. [employer] and its affiliates disclaim any warranty or liability relating to this information or the use thereof. The use of this information is governed by the Terms of Use, available at https://www.woltersTransphormuwer.com/en/know/clinical-effectiveness-terms   Copyright   Copyright © 2024 [employer] and its affiliates and/or licensors. All rights reserved.

## 2024-08-19 NOTE — PROGRESS NOTES
"Orthopaedic Surgery - Office Note  Gina Hassan (73 y.o. female)   : 1950   MRN: [id number]  Encounter Date: 2024    Chief Complaint   Patient presents with    Right Knee - Follow-up     EUFFLEXA INJECTION #3         Assessment/Plan  Diagnoses and all orders for this visit:    Chronic pain of right knee    Primary osteoarthritis of right knee    Patient will follow all post injection instructions.  She will watch for signs of infection and call if they develop immediately     Return for Recheck in 1 week for Visco #3.        History of Present Illness  Patient is here for Euflexxa No. 2 in the right knee.  She denies any ill effects from previous injection.  She wishes to proceed with today's injection    Review of Systems  Pertinent items are noted in HPI.  All other systems were reviewed and are negative.    Physical Exam  There were no vitals taken for this visit.  Cons: Appears well.  No apparent distress.  Psych: Alert. Oriented x3.  Mood and affect normal.  Right knee is without signs of infection or contraindication to procedure              Studies Reviewed      Large joint arthrocentesis: R knee  Universal Protocol:  Consent: Verbal consent obtained.  Risks and benefits: risks, benefits and alternatives were discussed  Consent given by: patient  Time out: Immediately prior to procedure a \"time out\" was called to verify the correct patient, procedure, equipment, support staff and site/side marked as required.  Patient understanding: patient states understanding of the procedure being performed  Patient consent: the patient's understanding of the procedure matches consent given  Relevant documents: relevant documents present and verified  Test results: test results available and properly labeled  Site marked: the operative site was marked  Radiology Images displayed and confirmed. If images not available, report reviewed: imaging studies available  Patient identity confirmed: verbally with " patient  Supporting Documentation  Indications: pain   Procedure Details  Location: knee - R knee  Needle size: 22 G  Ultrasound guidance: no  Approach: anterolateral  Medications administered: 20 mg Sodium Hyaluronate (Viscosup) 20 MG/2ML    Patient tolerance: patient tolerated the procedure well with no immediate complications  Dressing:  Sterile dressing applied            Medical, Surgical, Family, and Social History  The patient's medical history, family history, and social history, were reviewed and updated as appropriate.    Past Medical History:   Diagnosis Date    Anxiety     Cancer (HCC)     Uterine Ca and Skin    Hypertension        Past Surgical History:   Procedure Laterality Date    ABDOMINAL HYSTERECTOMY      with removal of both ovaries     HYSTERECTOMY         Family History   Problem Relation Age of Onset    Diabetes Mother     Heart disease Mother     Stroke Mother     Kidney cancer Brother        Social History     Occupational History    Not on file   Tobacco Use    Smoking status: Never    Smokeless tobacco: Never   Vaping Use    Vaping status: Never Used   Substance and Sexual Activity    Alcohol use: No     Comment: socially     Drug use: Never    Sexual activity: Not on file       Allergies   Allergen Reactions    Erythromycin Base Other (See Comments)     Rash DIARRHEA    Penicillins Other (See Comments)     ALL MYCINS RASH           Current Outpatient Medications:     Ascorbic Acid (VITAMIN C PO), Take by mouth, Disp: , Rfl:     aspirin (ECOTRIN LOW STRENGTH) 81 mg EC tablet, Take 81 mg by mouth daily, Disp: , Rfl:     Calcium Carbonate-Vitamin D 600-200 MG-UNIT CAPS, Take 1 tablet by mouth, Disp: , Rfl:     co-enzyme Q-10 30 mg, Take by mouth, Disp: , Rfl:     ELDERBERRY PO, Take by mouth, Disp: , Rfl:     LORazepam (ATIVAN) 0.5 mg tablet, Take 1 tablet (0.5 mg total) by mouth every 8 (eight) hours as needed for anxiety, Disp: 15 tablet, Rfl: 0    Multiple Vitamins-Minerals (MULTIVITAMIN  ADULT EXTRA C PO), Take 1 tablet by mouth daily, Disp: , Rfl:     Multiple Vitamins-Minerals (ZINC PO), Take by mouth, Disp: , Rfl:     multivitamin (THERAGRAN) TABS, Take 1 tablet by mouth daily, Disp: , Rfl:     NON FORMULARY, Take 1 tablet by mouth 2 (two) times a day with meals CALCIUM CARBONATE-VITAMIN D (OS-EUSEBIA D) 500 MG-5 MCG (200 UNIT) PER TABLET, Disp: , Rfl:     PARoxetine (PAXIL) 20 mg tablet, Take 1 tablet (20 mg total) by mouth daily, Disp: 90 tablet, Rfl: 1    triamterene-hydrochlorothiazide (MAXZIDE-25) 37.5-25 mg per tablet, Take 1 tablet by mouth daily, Disp: 90 tablet, Rfl: 3      MARY SelbyC

## 2024-08-22 ENCOUNTER — TELEPHONE (OUTPATIENT)
Age: 74
End: 2024-08-22

## 2024-08-22 ENCOUNTER — TELEPHONE (OUTPATIENT)
Dept: FAMILY MEDICINE CLINIC | Facility: MEDICAL CENTER | Age: 74
End: 2024-08-22

## 2024-08-22 NOTE — TELEPHONE ENCOUNTER
Pt called in to schedule her yearly TB test. I was unable to locate an order in the pts chart for this. Pt states this is for her employer and she has the order from them. Called and spoke to Lisset on how to schedule the pt. She was able to assist and take the call over

## 2024-08-26 ENCOUNTER — PROCEDURE VISIT (OUTPATIENT)
Dept: OBGYN CLINIC | Facility: CLINIC | Age: 74
End: 2024-08-26
Payer: MEDICARE

## 2024-08-26 VITALS
SYSTOLIC BLOOD PRESSURE: 157 MMHG | WEIGHT: 152 LBS | HEART RATE: 71 BPM | DIASTOLIC BLOOD PRESSURE: 74 MMHG | BODY MASS INDEX: 29.84 KG/M2 | HEIGHT: 60 IN

## 2024-08-26 DIAGNOSIS — G89.29 CHRONIC PAIN OF RIGHT KNEE: ICD-10-CM

## 2024-08-26 DIAGNOSIS — M17.11 PRIMARY OSTEOARTHRITIS OF RIGHT KNEE: Primary | ICD-10-CM

## 2024-08-26 DIAGNOSIS — M25.561 CHRONIC PAIN OF RIGHT KNEE: ICD-10-CM

## 2024-08-26 PROCEDURE — 20610 DRAIN/INJ JOINT/BURSA W/O US: CPT | Performed by: PHYSICIAN ASSISTANT

## 2024-08-26 RX ORDER — HYALURONATE SODIUM 10 MG/ML
20 SYRINGE (ML) INTRAARTICULAR
Status: COMPLETED | OUTPATIENT
Start: 2024-08-26 | End: 2024-08-26

## 2024-08-26 RX ADMIN — Medication 20 MG: at 15:00

## 2024-08-26 NOTE — PROGRESS NOTES
"Orthopaedic Surgery - Office Note  Gina Hassan (73 y.o. female)   : 1950   MRN: [id number]  Encounter Date: 2024    Chief Complaint   Patient presents with    Right Knee - Pain     EUFFLEXA INJECTION         Assessment/Plan  Diagnoses and all orders for this visit:    Primary osteoarthritis of right knee  -     Large joint arthrocentesis: R knee    Chronic pain of right knee  -     Large joint arthrocentesis: R knee         Return if symptoms worsen or fail to improve.        History of Present Illness  Patient is here for right knee Euflexxa No. 3.  She denies any ill effects from the first 2 injections.  Patient reports significant decrease in pain with the first 2 injections and is quite happy with the results    Review of Systems  Pertinent items are noted in HPI.  All other systems were reviewed and are negative.    Physical Exam  /74 (BP Location: Left arm, Patient Position: Sitting, Cuff Size: Standard)   Pulse 71   Ht 5' (1.524 m)   Wt 68.9 kg (152 lb)   BMI 29.69 kg/m²   Cons: Appears well.  No apparent distress.  Psych: Alert. Oriented x3.  Mood and affect normal.  Right knee is without intra-articular effusion and there are no contraindications to proceeding with the last injection of the series.              Studies Reviewed  X-rays were reviewed    Large joint arthrocentesis: R knee  Universal Protocol:  Consent: Verbal consent obtained.  Risks and benefits: risks, benefits and alternatives were discussed  Consent given by: patient  Time out: Immediately prior to procedure a \"time out\" was called to verify the correct patient, procedure, equipment, support staff and site/side marked as required.  Patient understanding: patient states understanding of the procedure being performed  Patient consent: the patient's understanding of the procedure matches consent given  Relevant documents: relevant documents present and verified  Test results: test results available and properly " labeled  Site marked: the operative site was marked  Radiology Images displayed and confirmed. If images not available, report reviewed: imaging studies available  Patient identity confirmed: verbally with patient  Supporting Documentation  Indications: pain   Procedure Details  Location: knee - R knee  Preparation: Patient was prepped and draped in the usual sterile fashion  Needle size: 22 G  Ultrasound guidance: no  Approach: anterolateral  Medications administered: 20 mg Sodium Hyaluronate (Viscosup) 20 MG/2ML    Patient tolerance: patient tolerated the procedure well with no immediate complications  Dressing:  Sterile dressing applied                Medical, Surgical, Family, and Social History  The patient's medical history, family history, and social history, were reviewed and updated as appropriate.    Past Medical History:   Diagnosis Date    Anxiety     Cancer (HCC)     Uterine Ca and Skin    Hypertension        Past Surgical History:   Procedure Laterality Date    ABDOMINAL HYSTERECTOMY      with removal of both ovaries     HYSTERECTOMY         Family History   Problem Relation Age of Onset    Diabetes Mother     Heart disease Mother     Stroke Mother     Kidney cancer Brother        Social History     Occupational History    Not on file   Tobacco Use    Smoking status: Never    Smokeless tobacco: Never   Vaping Use    Vaping status: Never Used   Substance and Sexual Activity    Alcohol use: No     Comment: socially     Drug use: Never    Sexual activity: Not on file       Allergies   Allergen Reactions    Erythromycin Base Other (See Comments)     Rash DIARRHEA    Penicillins Other (See Comments)     ALL MYCINS RASH           Current Outpatient Medications:     Ascorbic Acid (VITAMIN C PO), Take by mouth, Disp: , Rfl:     aspirin (ECOTRIN LOW STRENGTH) 81 mg EC tablet, Take 81 mg by mouth daily, Disp: , Rfl:     Calcium Carbonate-Vitamin D 600-200 MG-UNIT CAPS, Take 1 tablet by mouth, Disp: , Rfl:      co-enzyme Q-10 30 mg, Take by mouth, Disp: , Rfl:     ELDERBERRY PO, Take by mouth, Disp: , Rfl:     LORazepam (ATIVAN) 0.5 mg tablet, Take 1 tablet (0.5 mg total) by mouth every 8 (eight) hours as needed for anxiety, Disp: 15 tablet, Rfl: 0    Multiple Vitamins-Minerals (MULTIVITAMIN ADULT EXTRA C PO), Take 1 tablet by mouth daily, Disp: , Rfl:     Multiple Vitamins-Minerals (ZINC PO), Take by mouth, Disp: , Rfl:     multivitamin (THERAGRAN) TABS, Take 1 tablet by mouth daily, Disp: , Rfl:     NON FORMULARY, Take 1 tablet by mouth 2 (two) times a day with meals CALCIUM CARBONATE-VITAMIN D (OS-EUSEBIA D) 500 MG-5 MCG (200 UNIT) PER TABLET, Disp: , Rfl:     PARoxetine (PAXIL) 20 mg tablet, Take 1 tablet (20 mg total) by mouth daily, Disp: 90 tablet, Rfl: 1    triamterene-hydrochlorothiazide (MAXZIDE-25) 37.5-25 mg per tablet, Take 1 tablet by mouth daily, Disp: 90 tablet, Rfl: 3      Vikram Cortez PA-C

## 2024-08-26 NOTE — PATIENT INSTRUCTIONS
Patient Education     Viscosupplementation   Why is this procedure done?   Your joints are where two bones meet. The ends of the bones are covered with a protective coating of cartilage. This helps them glide smoothly during movement. A fluid also helps the joint move more smoothly. With years of use, the cartilage may begin to wear away. This causes pain in the joints. This procedure is done to relieve the pain. A special fluid is used to help the bones glide more easily. It also acts like a shock absorber. This is most often done on the knee joints.  What will the results be?   Lubricates the joint  Less pain in the joints during movement or weight bearing  Improved joint mobility or movement  What happens before the procedure?   Your doctor may ask you to try other ways to treat osteoarthritis or OA, such as exercise, physical therapy, drugs for pain, applying hot compress, and losing weight.  Talk to your doctor about all the drugs you are taking. Be sure to include all prescription and over-the-counter (OTC) drugs, and herbal supplements. Tell the doctor about any drug allergy. Bring a list of drugs you take with you. Some drugs may interact with the injection.  What happens during the procedure?   Your doctor will clean the skin area where the injection will be given. You will be given a drug called local anesthesia. This will numb your skin and you will not feel any pain.  In some cases, your doctor might use imaging like x-ray or ultrasound to make sure they inject the right spot.  If you have excess fluid in your joint, your doctor may remove the fluid before beginning.  A needle will be used to inject the hyaluronic acid into the joint. Hyaluronic acid is a natural fluid in your body. It lubricates the joint to ease body movements.  The doctor will cover the injection site with a small bandage to prevent infection.  The procedure may only take a couple of minutes.  What happens after the procedure?   You  may feel slight pain, warmth, and swelling around the joint area.  You will be able to go home after the injection.  What care is needed at home?   Ask your doctor what you need to do when you go home. Make sure you ask questions if you do not understand what the doctor says. This way you will know what you need to do.  Place an ice pack or a bag of frozen peas wrapped in a towel over the painful part. Never put ice right on the skin. Do not leave the ice on more than 10 to 15 minutes at a time.  Rest for the first few days after the procedure. Avoid strenuous activities like heavy lifting, jogging, standing for a long time, and hard exercise.  What follow-up care is needed?   Your doctor may ask you to make visits to the office to check on your progress. Be sure to keep these visits. Your doctor may want you to have more injections.  What lifestyle changes are needed?   Ask your doctor about when you can go back to your normal activities like exercise or work.  What problems could happen?   Pain, redness, and swelling around the injection site  Infection of the joint  Fever  Allergic reaction  Itching  Rash  Bruising  Bleeding in the joint  No change in pain after injection  Last Reviewed Date   2021-03-30  Consumer Information Use and Disclaimer   This generalized information is a limited summary of diagnosis, treatment, and/or medication information. It is not meant to be comprehensive and should be used as a tool to help the user understand and/or assess potential diagnostic and treatment options. It does NOT include all information about conditions, treatments, medications, side effects, or risks that may apply to a specific patient. It is not intended to be medical advice or a substitute for the medical advice, diagnosis, or treatment of a health care provider based on the health care provider's examination and assessment of a patient’s specific and unique circumstances. Patients must speak with a health care  provider for complete information about their health, medical questions, and treatment options, including any risks or benefits regarding use of medications. This information does not endorse any treatments or medications as safe, effective, or approved for treating a specific patient. UpTo[employer] Inc. and its affiliates disclaim any warranty or liability relating to this information or the use thereof. The use of this information is governed by the Terms of Use, available at https://www.[employer].com/en/know/clinical-effectiveness-terms   Copyright   Copyright © 2024 [employer]ToDate, Inc. and its affiliates and/or licensors. All rights reserved.

## 2024-08-27 ENCOUNTER — CLINICAL SUPPORT (OUTPATIENT)
Dept: FAMILY MEDICINE CLINIC | Facility: MEDICAL CENTER | Age: 74
End: 2024-08-27
Payer: MEDICARE

## 2024-08-27 DIAGNOSIS — Z11.1 SCREENING FOR TUBERCULOSIS: ICD-10-CM

## 2024-08-27 PROCEDURE — 86580 TB INTRADERMAL TEST: CPT

## 2024-08-29 ENCOUNTER — CLINICAL SUPPORT (OUTPATIENT)
Dept: FAMILY MEDICINE CLINIC | Facility: MEDICAL CENTER | Age: 74
End: 2024-08-29

## 2024-08-29 DIAGNOSIS — Z11.1 ENCOUNTER FOR PPD SKIN TEST READING: Primary | ICD-10-CM

## 2024-08-29 LAB
INDURATION: 0 MM
TB SKIN TEST: NEGATIVE

## 2024-11-13 ENCOUNTER — OFFICE VISIT (OUTPATIENT)
Dept: FAMILY MEDICINE CLINIC | Facility: MEDICAL CENTER | Age: 74
End: 2024-11-13
Payer: MEDICARE

## 2024-11-13 VITALS
HEART RATE: 66 BPM | DIASTOLIC BLOOD PRESSURE: 66 MMHG | TEMPERATURE: 97.3 F | BODY MASS INDEX: 30.39 KG/M2 | OXYGEN SATURATION: 95 % | SYSTOLIC BLOOD PRESSURE: 124 MMHG | RESPIRATION RATE: 18 BRPM | WEIGHT: 155.6 LBS

## 2024-11-13 DIAGNOSIS — F41.9 ANXIETY: ICD-10-CM

## 2024-11-13 DIAGNOSIS — I10 PRIMARY HYPERTENSION: Primary | ICD-10-CM

## 2024-11-13 DIAGNOSIS — Z11.59 NEED FOR HEPATITIS C SCREENING TEST: ICD-10-CM

## 2024-11-13 DIAGNOSIS — C55 MALIGNANT NEOPLASM OF UTERUS, UNSPECIFIED SITE (HCC): ICD-10-CM

## 2024-11-13 DIAGNOSIS — C44.90 SKIN CANCER: ICD-10-CM

## 2024-11-13 DIAGNOSIS — M17.0 PRIMARY OSTEOARTHRITIS OF BOTH KNEES: ICD-10-CM

## 2024-11-13 PROBLEM — M25.562 CHRONIC PAIN OF LEFT KNEE: Status: RESOLVED | Noted: 2024-05-16 | Resolved: 2024-11-13

## 2024-11-13 PROBLEM — G89.29 CHRONIC PAIN OF LEFT KNEE: Status: RESOLVED | Noted: 2024-05-16 | Resolved: 2024-11-13

## 2024-11-13 PROCEDURE — 99214 OFFICE O/P EST MOD 30 MIN: CPT | Performed by: INTERNAL MEDICINE

## 2024-11-13 PROCEDURE — G2211 COMPLEX E/M VISIT ADD ON: HCPCS | Performed by: INTERNAL MEDICINE

## 2024-11-13 NOTE — PROGRESS NOTES
Ambulatory Visit  Name: Gina Hassan      : 1950      MRN: [id number]  Encounter Provider: Eros Marion MD  Encounter Date: 2024   Encounter department: [facility] [place]    Assessment & Plan  Primary hypertension  Blood pressure is very well-controlled, continue the same medication  Orders:    CBC and differential; Future    Comprehensive metabolic panel; Future    Lipid panel; Future    TSH, 3rd generation; Future    Anxiety  Continue Paxil       Primary osteoarthritis of both knees  Just received gel shots in both her knees and is feeling better.  Follow-up with orthopedics       Malignant neoplasm of uterus, unspecified site (HCC)  Had a total hysterectomy done and was following up with oncology until 3 years ago but not doing it now       Skin cancer  Follows up with advanced dermatology.  Had 2 melanoma lesions removed from her chest recently.  Now going for removal of the basal cell carcinoma on her nose followed by Mohs surgery in 2 days.       Need for hepatitis C screening test    Orders:    Hepatitis C Antibody; Future      Depression Screening and Follow-up Plan: Patient was screened for depression during today's encounter. They screened negative with a PHQ-2 score of 2.      History of Present Illness     HPI  Blood pressure has been controlled on the present medications.  Has anxiety and is taking Paxil for it which is controlling the symptoms.  Wanting to wean herself off the Paxil but I told her to continue it.  Has chronic pain in both her knees and is following up with orthopedics.  Has a history of uterine cancer for which she had a total hysterectomy done.  Has a tendency to get multiple skin cancers.  Now going for removal of the basal cell carcinoma on her nose and is getting very anxious about it.        Review of Systems   Constitutional:  Negative for chills, diaphoresis, fatigue and fever.   HENT:  Negative for congestion, ear discharge, ear pain, hearing  loss, postnasal drip, rhinorrhea, sinus pressure, sinus pain, sneezing, sore throat and voice change.    Eyes:  Negative for pain, discharge, redness and visual disturbance.   Respiratory:  Negative for cough, chest tightness, shortness of breath and wheezing.    Cardiovascular:  Negative for chest pain, palpitations and leg swelling.   Gastrointestinal:  Negative for abdominal distention, abdominal pain, blood in stool, constipation, diarrhea, nausea and vomiting.   Endocrine: Negative for cold intolerance, heat intolerance, polydipsia, polyphagia and polyuria.   Genitourinary:  Negative for dysuria, flank pain, frequency, hematuria and urgency.   Musculoskeletal:  Positive for arthralgias. Negative for back pain, gait problem, joint swelling, myalgias, neck pain and neck stiffness.   Skin:  Negative for rash.        Has a basal cell lesion on the nose   Neurological:  Negative for dizziness, tremors, syncope, facial asymmetry, speech difficulty, weakness, light-headedness, numbness and headaches.   Hematological:  Does not bruise/bleed easily.   Psychiatric/Behavioral:  Negative for behavioral problems, confusion and sleep disturbance. The patient is not nervous/anxious.      Medical History Reviewed by provider this encounter:  Tobacco  Allergies  Meds  Problems  Med Hx  Surg Hx  Fam Hx           Objective     /66 (BP Location: Left arm, Patient Position: Sitting, Cuff Size: Standard)   Pulse 66   Temp (!) 97.3 °F (36.3 °C) (Temporal)   Resp 18   Wt 70.6 kg (155 lb 9.6 oz)   SpO2 95%   BMI 30.39 kg/m²     Physical Exam  Constitutional:       General: She is not in acute distress.     Appearance: She is well-developed. She is not diaphoretic.   HENT:      Head: Normocephalic and atraumatic.      Right Ear: External ear normal.      Left Ear: External ear normal.      Nose: Nose normal.   Eyes:      General: No scleral icterus.        Right eye: No discharge.         Left eye: No discharge.       Conjunctiva/sclera: Conjunctivae normal.   Cardiovascular:      Rate and Rhythm: Normal rate and regular rhythm.      Heart sounds: Normal heart sounds. No murmur heard.     No friction rub. No gallop.   Pulmonary:      Effort: Pulmonary effort is normal. No respiratory distress.      Breath sounds: Normal breath sounds. No wheezing or rales.   Abdominal:      General: Bowel sounds are normal. There is no distension.      Palpations: Abdomen is soft.      Tenderness: There is no abdominal tenderness. There is no guarding or rebound.   Musculoskeletal:         General: No tenderness.   Skin:     General: Skin is warm and dry.      Findings: Lesion present. No erythema or rash.   Neurological:      Mental Status: She is alert and oriented to person, place, and time.      Cranial Nerves: No cranial nerve deficit.      Sensory: No sensory deficit.      Motor: No abnormal muscle tone.   Psychiatric:         Behavior: Behavior normal.

## 2024-11-13 NOTE — ASSESSMENT & PLAN NOTE
Blood pressure is very well-controlled, continue the same medication  Orders:    CBC and differential; Future    Comprehensive metabolic panel; Future    Lipid panel; Future    TSH, 3rd generation; Future

## 2024-11-13 NOTE — ASSESSMENT & PLAN NOTE
Had a total hysterectomy done and was following up with oncology until 3 years ago but not doing it now

## 2024-11-13 NOTE — ASSESSMENT & PLAN NOTE
Follows up with advanced dermatology.  Had 2 melanoma lesions removed from her chest recently.  Now going for removal of the basal cell carcinoma on her nose followed by Mohs surgery in 2 days.

## 2024-12-19 ENCOUNTER — OFFICE VISIT (OUTPATIENT)
Dept: OBGYN CLINIC | Facility: CLINIC | Age: 74
End: 2024-12-19
Payer: MEDICARE

## 2024-12-19 DIAGNOSIS — M17.11 PRIMARY OSTEOARTHRITIS OF RIGHT KNEE: Primary | ICD-10-CM

## 2024-12-19 DIAGNOSIS — G89.29 CHRONIC PAIN OF RIGHT KNEE: ICD-10-CM

## 2024-12-19 DIAGNOSIS — G89.29 CHRONIC PAIN OF LEFT KNEE: ICD-10-CM

## 2024-12-19 DIAGNOSIS — M25.561 CHRONIC PAIN OF RIGHT KNEE: ICD-10-CM

## 2024-12-19 DIAGNOSIS — M17.12 PRIMARY OSTEOARTHRITIS OF LEFT KNEE: ICD-10-CM

## 2024-12-19 DIAGNOSIS — M25.562 CHRONIC PAIN OF LEFT KNEE: ICD-10-CM

## 2024-12-19 PROCEDURE — 99213 OFFICE O/P EST LOW 20 MIN: CPT | Performed by: PHYSICIAN ASSISTANT

## 2024-12-19 PROCEDURE — 20610 DRAIN/INJ JOINT/BURSA W/O US: CPT | Performed by: PHYSICIAN ASSISTANT

## 2024-12-19 RX ORDER — LIDOCAINE HYDROCHLORIDE 10 MG/ML
2 INJECTION, SOLUTION INFILTRATION; PERINEURAL
Status: COMPLETED | OUTPATIENT
Start: 2024-12-19 | End: 2024-12-19

## 2024-12-19 RX ORDER — TRIAMCINOLONE ACETONIDE 40 MG/ML
80 INJECTION, SUSPENSION INTRA-ARTICULAR; INTRAMUSCULAR
Status: COMPLETED | OUTPATIENT
Start: 2024-12-19 | End: 2024-12-19

## 2024-12-19 RX ADMIN — TRIAMCINOLONE ACETONIDE 80 MG: 40 INJECTION, SUSPENSION INTRA-ARTICULAR; INTRAMUSCULAR at 15:00

## 2024-12-19 RX ADMIN — LIDOCAINE HYDROCHLORIDE 2 ML: 10 INJECTION, SOLUTION INFILTRATION; PERINEURAL at 15:00

## 2024-12-19 NOTE — PROGRESS NOTES
Orthopaedic Surgery - Office Note  Gina Hassan (74 y.o. female)   : 1950   MRN: [id number]  Encounter Date: 2024    Chief Complaint   Patient presents with    Left Knee - Follow-up    Right Knee - Follow-up         Assessment/Plan  Diagnoses and all orders for this visit:    Primary osteoarthritis of right knee    Chronic pain of right knee    Primary osteoarthritis of left knee    Chronic pain of left knee    The diagnosis as well as treatment options were reviewed with the patient in the office today.  Symptomatic relief with icing 20 minutes on 1 hour off 3 times a day were encouraged.  She may use an oral anti-inflammatory such as Aleve 1 tablet twice daily with food stopping calling if any stomach upset occurs.  A cortisone injection could be considered.  Risks and benefits were reviewed.  Shared decision making was utilized.  She elected to proceed with the cortisone injection in both knees today, tolerating it well without complication.     Physical therapy was offered to help maintain range of motion and strength as well as decreasing symptoms.  Patient declines to consider PT or bracing at this time     She is aware of the current treatments are for symptoms and a more definitive treatment of the pathology would be a total knee arthroplasty.  She is adamant she does not wish to consider any surgery at this time.     Repeat visco injections as a next step treatment option were reviewed.  She would like to consider the Synvisc 1 injection for both knees if she had to repeat them in the future     Return if symptoms worsen or fail to improve.        History of Present Illness  This is a previous patient with known right and left knee osteoarthritis.  Patient has treated conservatively in the past with a Visco injection series being completed on the right side on 2024.  The left knee last had an Synvisc 1 injection on 2024.  The left knee had a cortisone injection on 2024.  No new  trauma is reported.  Patient does not wish to consider surgical intervention.  She is requesting consideration towards repeat cortisone injections in both knees.  Patient reports she believes she had better relief from the Synvisc 1 then the series of 3 injections on the right side.  No new injuries are reported.    Review of Systems  Pertinent items are noted in HPI.  All other systems were reviewed and are negative.    Physical Exam  There were no vitals taken for this visit.  Cons: Appears well.  No apparent distress.  Psych: Alert. Oriented x3.  Mood and affect normal.    Patient's left knee is without effusion.  There is no skin breakdown lesion or signs of infection.  She is tender on the medial joint line.  She is nontender in the lateral joint line.  She has no instability or pain with valgus and varus stressing.  She has full extension and flexes to about 120 degrees in the office today.  Quad and hamstring strength are 5 out of 5.  Her gait is heel-to-toe.  She has no instability to Lachman and posterior drawer.  There is no calf tenderness and a negative Homans.  Her hip moves well and she has a negative straight leg raise    Patient's right knee is without intra-articular effusion.  She is tender on the medial and lateral joint line.  There is no pain or instability to valgus and varus stressing.  She has full extension and flexes to 120 degrees.  Quad and hamstring strength are 5 out of 5.  Gait is heel-to-toe.  There is no evidence of DVT or infection.      Studies Reviewed    LEFT KNEE     INDICATION:   Unilateral primary osteoarthritis, left knee.      COMPARISON:  None.     VIEWS:  XR KNEE 3 VW LEFT NON INJURY      FINDINGS:     There is no acute fracture or dislocation.     There is a small joint effusion.     Compared to prior study of 9/20/2021, there is mild narrowing of both the medial and lateral compartment spaces. Small osteophytes of the medial and lateral tibial plateau and femoral  "epicondyles. Patella is intact. Patellofemoral space preserved.     No lytic or blastic osseous lesion.     Soft tissues are unremarkable.     IMPRESSION:        No significant change from prior study.  Mild medial and lateral compartment degenerative changes as above.  Preserved patellofemoral space.  Small effusion.  No fracture        Electronically signed: 05/15/2024 02:33 PM Jethro Billingsley MD  XR KNEE 1 OR 2 VW RIGHT     INDICATION: right knee effusion.     COMPARISON: 7/2/2024     FINDINGS:     No acute fracture or dislocation.     Small joint effusion.     Tricompartment mild narrowing with small osteophytes; osteoarthritis.     No lytic or blastic osseous lesion.     Unremarkable soft tissues.     IMPRESSION:     No acute osseous abnormality.           Workstation performed: [id number]  Large joint arthrocentesis: bilateral knee  Universal Protocol:  Consent: Verbal consent obtained.  Risks and benefits: risks, benefits and alternatives were discussed  Consent given by: patient  Time out: Immediately prior to procedure a \"time out\" was called to verify the correct patient, procedure, equipment, support staff and site/side marked as required.  Patient understanding: patient states understanding of the procedure being performed  Patient consent: the patient's understanding of the procedure matches consent given  Relevant documents: relevant documents present and verified  Test results: test results available and properly labeled  Site marked: the operative site was marked  Radiology Images displayed and confirmed. If images not available, report reviewed: imaging studies available  Patient identity confirmed: verbally with patient  Supporting Documentation  Indications: pain   Procedure Details  Location: knee - bilateral knee  Preparation: Patient was prepped and draped in the usual sterile fashion  Needle size: 22 G  Ultrasound guidance: no  Approach: anterolateral    Medications (Right): 2 mL lidocaine " 1 %; 80 mg triamcinolone acetonide 40 mg/mLMedications (Left): 2 mL lidocaine 1 %; 80 mg triamcinolone acetonide 40 mg/mL   Patient tolerance: patient tolerated the procedure well with no immediate complications  Dressing:  Sterile dressing applied            Medical, Surgical, Family, and Social History  The patient's medical history, family history, and social history, were reviewed and updated as appropriate.    Past Medical History:   Diagnosis Date    Anxiety     Cancer (HCC)     Uterine Ca and Skin    Hypertension        Past Surgical History:   Procedure Laterality Date    ABDOMINAL HYSTERECTOMY      with removal of both ovaries     HYSTERECTOMY         Family History   Problem Relation Age of Onset    Diabetes Mother     Heart disease Mother     Stroke Mother     Kidney cancer Brother        Social History     Occupational History    Not on file   Tobacco Use    Smoking status: Never    Smokeless tobacco: Never   Vaping Use    Vaping status: Never Used   Substance and Sexual Activity    Alcohol use: No     Comment: socially     Drug use: Never    Sexual activity: Not on file       Allergies   Allergen Reactions    Erythromycin Base Other (See Comments)     Rash DIARRHEA    Penicillins Other (See Comments)     ALL MYCINS RASH           Current Outpatient Medications:     Ascorbic Acid (VITAMIN C PO), Take by mouth, Disp: , Rfl:     aspirin (ECOTRIN LOW STRENGTH) 81 mg EC tablet, Take 81 mg by mouth daily (Patient not taking: Reported on 11/13/2024), Disp: , Rfl:     Calcium Carbonate-Vitamin D 600-200 MG-UNIT CAPS, Take 1 tablet by mouth, Disp: , Rfl:     ELDERBERRY PO, Take by mouth, Disp: , Rfl:     LORazepam (ATIVAN) 0.5 mg tablet, Take 1 tablet (0.5 mg total) by mouth every 8 (eight) hours as needed for anxiety, Disp: 15 tablet, Rfl: 0    Multiple Vitamins-Minerals (ZINC PO), Take by mouth, Disp: , Rfl:     NON FORMULARY, Take 1 tablet by mouth 2 (two) times a day with meals CALCIUM CARBONATE-VITAMIN D  (OS-EUSEBIA D) 500 MG-5 MCG (200 UNIT) PER TABLET, Disp: , Rfl:     PARoxetine (PAXIL) 20 mg tablet, Take 1 tablet (20 mg total) by mouth daily, Disp: 90 tablet, Rfl: 1    triamterene-hydrochlorothiazide (MAXZIDE-25) 37.5-25 mg per tablet, Take 1 tablet by mouth daily, Disp: 90 tablet, Rfl: 3      Vikram Cortez PA-C

## 2025-02-13 DIAGNOSIS — F41.1 GAD (GENERALIZED ANXIETY DISORDER): ICD-10-CM

## 2025-02-13 RX ORDER — PAROXETINE 20 MG/1
20 TABLET, FILM COATED ORAL DAILY
Qty: 90 TABLET | Refills: 1 | Status: SHIPPED | OUTPATIENT
Start: 2025-02-13

## 2025-02-13 NOTE — TELEPHONE ENCOUNTER
Reason for call:   [x] Refill   [] Prior Auth  [] Other:     Office:   [x] PCP/Provider -   [] Specialty/Provider -     Medication: Paroxetine    Dose/Frequency: 20 mg    Quantity: 90 tablet    Pharmacy: [address] [phone]     Does the patient have enough for 3 days?   [] Yes   [x] No - Send as HP to POD

## 2025-04-15 DIAGNOSIS — M25.561 CHRONIC PAIN OF RIGHT KNEE: ICD-10-CM

## 2025-04-15 DIAGNOSIS — G89.29 CHRONIC PAIN OF LEFT KNEE: ICD-10-CM

## 2025-04-15 DIAGNOSIS — G89.29 CHRONIC PAIN OF RIGHT KNEE: ICD-10-CM

## 2025-04-15 DIAGNOSIS — M17.12 PRIMARY OSTEOARTHRITIS OF LEFT KNEE: ICD-10-CM

## 2025-04-15 DIAGNOSIS — M25.562 CHRONIC PAIN OF LEFT KNEE: ICD-10-CM

## 2025-04-15 DIAGNOSIS — M17.11 PRIMARY OSTEOARTHRITIS OF RIGHT KNEE: Primary | ICD-10-CM

## 2025-04-15 NOTE — PROGRESS NOTES
Patient called in requesting repeat Visco injection for both knees.  Chart review was completed.  She has requested to have the Synvisc 1 for both knees at her last visit.  She has had success with the Visco injection series in the past and has failed other conservative treatment measures.  She wishes to avoid surgical intervention.  Current pain level is 6 out of 10.

## 2025-04-24 NOTE — PATIENT INSTRUCTIONS
Patient Education     Viscosupplementation   Why is this procedure done?   Your joints are where two bones meet. The ends of the bones are covered with a protective coating of cartilage. This helps them glide smoothly during movement. A fluid also helps the joint move more smoothly. With years of use, the cartilage may begin to wear away. This causes pain in the joints. This procedure is done to relieve the pain. A special fluid is used to help the bones glide more easily. It also acts like a shock absorber. This is most often done on the knee joints.  What will the results be?   Lubricates the joint  Less pain in the joints during movement or weight bearing  Improved joint mobility or movement  What happens before the procedure?   Your doctor may ask you to try other ways to treat osteoarthritis or OA, such as exercise, physical therapy, drugs for pain, applying hot compress, and losing weight.  Talk to your doctor about all the drugs you are taking. Be sure to include all prescription and over-the-counter (OTC) drugs, and herbal supplements. Tell the doctor about any drug allergy. Bring a list of drugs you take with you. Some drugs may interact with the injection.  What happens during the procedure?   Your doctor will clean the skin area where the injection will be given. You will be given a drug called local anesthesia. This will numb your skin and you will not feel any pain.  In some cases, your doctor might use imaging like x-ray or ultrasound to make sure they inject the right spot.  If you have excess fluid in your joint, your doctor may remove the fluid before beginning.  A needle will be used to inject the hyaluronic acid into the joint. Hyaluronic acid is a natural fluid in your body. It lubricates the joint to ease body movements.  The doctor will cover the injection site with a small bandage to prevent infection.  The procedure may only take a couple of minutes.  What happens after the procedure?   You  may feel slight pain, warmth, and swelling around the joint area.  You will be able to go home after the injection.  What care is needed at home?   Ask your doctor what you need to do when you go home. Make sure you ask questions if you do not understand what the doctor says. This way you will know what you need to do.  Place an ice pack or a bag of frozen peas wrapped in a towel over the painful part. Never put ice right on the skin. Do not leave the ice on more than 10 to 15 minutes at a time.  Rest for the first few days after the procedure. Avoid strenuous activities like heavy lifting, jogging, standing for a long time, and hard exercise.  What follow-up care is needed?   Your doctor may ask you to make visits to the office to check on your progress. Be sure to keep these visits. Your doctor may want you to have more injections.  What lifestyle changes are needed?   Ask your doctor about when you can go back to your normal activities like exercise or work.  What problems could happen?   Pain, redness, and swelling around the injection site  Infection of the joint  Fever  Allergic reaction  Itching  Rash  Bruising  Bleeding in the joint  No change in pain after injection  Last Reviewed Date   2021-03-30  Consumer Information Use and Disclaimer   This generalized information is a limited summary of diagnosis, treatment, and/or medication information. It is not meant to be comprehensive and should be used as a tool to help the user understand and/or assess potential diagnostic and treatment options. It does NOT include all information about conditions, treatments, medications, side effects, or risks that may apply to a specific patient. It is not intended to be medical advice or a substitute for the medical advice, diagnosis, or treatment of a health care provider based on the health care provider's examination and assessment of a patient’s specific and unique circumstances. Patients must speak with a health care  provider for complete information about their health, medical questions, and treatment options, including any risks or benefits regarding use of medications. This information does not endorse any treatments or medications as safe, effective, or approved for treating a specific patient. [employer], [employer] and its affiliates disclaim any warranty or liability relating to this information or the use thereof. The use of this information is governed by the Terms of Use, available at https://www.[employer].com/en/know/clinical-effectiveness-terms   Copyright   Copyright © 2024 [employer], [employer] and its affiliates and/or licensors. All rights reserved.

## 2025-04-24 NOTE — PROGRESS NOTES
Orthopaedic Surgery - Office Note  Gina Hassan (74 y.o. female)   : 1950   MRN: [id number]  Encounter Date: 2025    Chief Complaint   Patient presents with    Right Knee - Follow-up     ORTHOVISCO  #1    Left Knee - Follow-up     ORTHOVISCO # 1         Assessment & Plan  Chronic pain of left knee         Chronic pain of right knee         Primary osteoarthritis of right knee         Primary osteoarthritis of left knee  Patient will follow all postinjection instructions.  She will watch for signs of infection and seek immediate medical care if they develop.            Return for Recheck in 1 week for #2.        History of Present Illness  Patient is here for Visco injections in the right and left knee.  She has had successful injections in the past.  No new injuries or trauma are reported.    Review of Systems  Pertinent items are noted in HPI.  All other systems were reviewed and are negative.    Physical Exam  Ht 5' (1.524 m)   Wt 70.3 kg (155 lb)   BMI 30.27 kg/m²   Cons: Appears well.  No apparent distress.  Psych: Alert. Oriented x3.  Mood and affect normal.    Right and left knee are without signs of infection.  There are no contraindications to proceeding with the Visco injection.          Studies Reviewed  LEFT KNEE     INDICATION:   Unilateral primary osteoarthritis, left knee.      COMPARISON:  None.     VIEWS:  XR KNEE 3 VW LEFT NON INJURY      FINDINGS:     There is no acute fracture or dislocation.     There is a small joint effusion.     Compared to prior study of 2021, there is mild narrowing of both the medial and lateral compartment spaces. Small osteophytes of the medial and lateral tibial plateau and femoral epicondyles. Patella is intact. Patellofemoral space preserved.     No lytic or blastic osseous lesion.     Soft tissues are unremarkable.     IMPRESSION:        No significant change from prior study.  Mild medial and lateral compartment degenerative changes as  "above.  Preserved patellofemoral space.  Small effusion.  No fracture        Electronically signed: 05/15/2024 02:33 PM Jethro Billingsley MD  [facility]     FINDINGS:     No acute fracture or dislocation.     Small joint effusion.     Tricompartment mild narrowing with small osteophytes; osteoarthritis.     No lytic or blastic osseous lesion.     Unremarkable soft tissues.     IMPRESSION:     No acute osseous abnormality.           Workstation performed: [id number]  Interpreted by Roland Nixon MD on 8/7/2024  8:36 PM  Narrative & Impression  XR KNEE 1 OR 2 VW RIGHT     INDICATION: right knee effusion.     COMPARISON: 7/2/2024     FINDINGS:     No acute fracture or dislocation.     Small joint effusion.     Tricompartment mild narrowing with small osteophytes; osteoarthritis.     No lytic or blastic osseous lesion.     Unremarkable soft tissues.      Large joint arthrocentesis: bilateral knee  Universal Protocol:  Consent: Verbal consent obtained.  Risks and benefits: risks, benefits and alternatives were discussed  Consent given by: patient  Time out: Immediately prior to procedure a \"time out\" was called to verify the correct patient, procedure, equipment, support staff and site/side marked as required.  Patient understanding: patient states understanding of the procedure being performed  Procedure consent: procedure consent matches procedure scheduled  Relevant documents: relevant documents present and verified  Test results: test results available and properly labeled  Site marked: the operative site was marked  Patient identity confirmed: verbally with patient  Supporting Documentation  Indications: pain     Is this a Visco injection? Yes  Non-Pharmacologic Treatments Attempted: Home Exercise  Pharmacologic Treatments Attempted: NSAIDs  Pain Score: 6Procedure Details  Location: knee - bilateral knee  Needle size: 22 G  Ultrasound guidance: no    Medications (Right): 30 mg sodium hyaluronate 30 " mg/2 mLMedications (Left): 30 mg sodium hyaluronate 30 mg/2 mL   Patient tolerance: patient tolerated the procedure well with no immediate complications  Dressing:  Sterile dressing applied            Medical, Surgical, Family, and Social History  The patient's medical history, family history, and social history, were reviewed and updated as appropriate.    Past Medical History:   Diagnosis Date    Anxiety     Cancer (HCC)     Uterine Ca and Skin    Hypertension        Past Surgical History:   Procedure Laterality Date    ABDOMINAL HYSTERECTOMY      with removal of both ovaries     HYSTERECTOMY         Family History   Problem Relation Age of Onset    Diabetes Mother     Heart disease Mother     Stroke Mother     Kidney cancer Brother        Social History     Occupational History    Not on file   Tobacco Use    Smoking status: Never    Smokeless tobacco: Never   Vaping Use    Vaping status: Never Used   Substance and Sexual Activity    Alcohol use: No     Comment: socially     Drug use: Never    Sexual activity: Not on file       Allergies   Allergen Reactions    Erythromycin Base Other (See Comments)     Rash DIARRHEA    Penicillins Other (See Comments)     ALL MYCINS RASH           Current Outpatient Medications:     Ascorbic Acid (VITAMIN C PO), Take by mouth, Disp: , Rfl:     aspirin (ECOTRIN LOW STRENGTH) 81 mg EC tablet, Take 81 mg by mouth daily (Patient not taking: Reported on 11/13/2024), Disp: , Rfl:     Calcium Carbonate-Vitamin D 600-200 MG-UNIT CAPS, Take 1 tablet by mouth, Disp: , Rfl:     ELDERBERRY PO, Take by mouth, Disp: , Rfl:     LORazepam (ATIVAN) 0.5 mg tablet, Take 1 tablet (0.5 mg total) by mouth every 8 (eight) hours as needed for anxiety, Disp: 15 tablet, Rfl: 0    Multiple Vitamins-Minerals (ZINC PO), Take by mouth, Disp: , Rfl:     NON FORMULARY, Take 1 tablet by mouth 2 (two) times a day with meals CALCIUM CARBONATE-VITAMIN D (OS-EUSEBIA D) 500 MG-5 MCG (200 UNIT) PER TABLET, Disp: , Rfl:      PARoxetine (PAXIL) 20 mg tablet, Take 1 tablet (20 mg total) by mouth daily, Disp: 90 tablet, Rfl: 1    triamterene-hydrochlorothiazide (MAXZIDE-25) 37.5-25 mg per tablet, Take 1 tablet by mouth daily, Disp: 90 tablet, Rfl: 3      Vikram Cortez PA-C

## 2025-04-26 ENCOUNTER — PROCEDURE VISIT (OUTPATIENT)
Dept: OBGYN CLINIC | Facility: CLINIC | Age: 75
End: 2025-04-26
Payer: MEDICARE

## 2025-04-26 VITALS — HEIGHT: 60 IN | WEIGHT: 155 LBS | BODY MASS INDEX: 30.43 KG/M2

## 2025-04-26 DIAGNOSIS — M25.562 CHRONIC PAIN OF LEFT KNEE: Primary | ICD-10-CM

## 2025-04-26 DIAGNOSIS — M25.561 CHRONIC PAIN OF RIGHT KNEE: ICD-10-CM

## 2025-04-26 DIAGNOSIS — M17.11 PRIMARY OSTEOARTHRITIS OF RIGHT KNEE: ICD-10-CM

## 2025-04-26 DIAGNOSIS — G89.29 CHRONIC PAIN OF RIGHT KNEE: ICD-10-CM

## 2025-04-26 DIAGNOSIS — M17.12 PRIMARY OSTEOARTHRITIS OF LEFT KNEE: ICD-10-CM

## 2025-04-26 DIAGNOSIS — G89.29 CHRONIC PAIN OF LEFT KNEE: Primary | ICD-10-CM

## 2025-04-26 PROCEDURE — 20610 DRAIN/INJ JOINT/BURSA W/O US: CPT | Performed by: PHYSICIAN ASSISTANT

## 2025-05-01 NOTE — PATIENT INSTRUCTIONS
Patient Education     Viscosupplementation   Why is this procedure done?   Your joints are where two bones meet. The ends of the bones are covered with a protective coating of cartilage. This helps them glide smoothly during movement. A fluid also helps the joint move more smoothly. With years of use, the cartilage may begin to wear away. This causes pain in the joints. This procedure is done to relieve the pain. A special fluid is used to help the bones glide more easily. It also acts like a shock absorber. This is most often done on the knee joints.  What will the results be?   Lubricates the joint  Less pain in the joints during movement or weight bearing  Improved joint mobility or movement  What happens before the procedure?   Your doctor may ask you to try other ways to treat osteoarthritis or OA, such as exercise, physical therapy, drugs for pain, applying hot compress, and losing weight.  Talk to your doctor about all the drugs you are taking. Be sure to include all prescription and over-the-counter (OTC) drugs, and herbal supplements. Tell the doctor about any drug allergy. Bring a list of drugs you take with you. Some drugs may interact with the injection.  What happens during the procedure?   Your doctor will clean the skin area where the injection will be given. You will be given a drug called local anesthesia. This will numb your skin and you will not feel any pain.  In some cases, your doctor might use imaging like x-ray or ultrasound to make sure they inject the right spot.  If you have excess fluid in your joint, your doctor may remove the fluid before beginning.  A needle will be used to inject the hyaluronic acid into the joint. Hyaluronic acid is a natural fluid in your body. It lubricates the joint to ease body movements.  The doctor will cover the injection site with a small bandage to prevent infection.  The procedure may only take a couple of minutes.  What happens after the procedure?   You  may feel slight pain, warmth, and swelling around the joint area.  You will be able to go home after the injection.  What care is needed at home?   Ask your doctor what you need to do when you go home. Make sure you ask questions if you do not understand what the doctor says. This way you will know what you need to do.  Place an ice pack or a bag of frozen peas wrapped in a towel over the painful part. Never put ice right on the skin. Do not leave the ice on more than 10 to 15 minutes at a time.  Rest for the first few days after the procedure. Avoid strenuous activities like heavy lifting, jogging, standing for a long time, and hard exercise.  What follow-up care is needed?   Your doctor may ask you to make visits to the office to check on your progress. Be sure to keep these visits. Your doctor may want you to have more injections.  What lifestyle changes are needed?   Ask your doctor about when you can go back to your normal activities like exercise or work.  What problems could happen?   Pain, redness, and swelling around the injection site  Infection of the joint  Fever  Allergic reaction  Itching  Rash  Bruising  Bleeding in the joint  No change in pain after injection  Last Reviewed Date   2021-03-30  Consumer Information Use and Disclaimer   This generalized information is a limited summary of diagnosis, treatment, and/or medication information. It is not meant to be comprehensive and should be used as a tool to help the user understand and/or assess potential diagnostic and treatment options. It does NOT include all information about conditions, treatments, medications, side effects, or risks that may apply to a specific patient. It is not intended to be medical advice or a substitute for the medical advice, diagnosis, or treatment of a health care provider based on the health care provider's examination and assessment of a patient’s specific and unique circumstances. Patients must speak with a health care  provider for complete information about their health, medical questions, and treatment options, including any risks or benefits regarding use of medications. This information does not endorse any treatments or medications as safe, effective, or approved for treating a specific patient. [employer] and its affiliates disclaim any warranty or liability relating to this information or the use thereof. The use of this information is governed by the Terms of Use, available at https://www.woltersCrashlyticsuwer.com/en/know/clinical-effectiveness-terms   Copyright   Copyright © 2024 [employer] and its affiliates and/or licensors. All rights reserved.

## 2025-05-01 NOTE — PROGRESS NOTES
"Orthopaedic Surgery - Office Note  Gina Hassan (74 y.o. female)   : 1950   MRN: [id number]  Encounter Date: 5/3/2025    Chief Complaint   Patient presents with    Right Knee - Follow-up     Orthovisco #2     Left Knee - Follow-up     Orthovisco #2         Assessment & Plan  Chronic pain of right knee         Chronic pain of left knee         Primary osteoarthritis of right knee  Patient will follow all post injection instructions.  She will watch for signs of infection and call if they develop.  All question concerns were answered in the office today.       Primary osteoarthritis of left knee            Return for Recheck in 1 week for #3.        History of Present Illness  Patient is here for Orthovisc in the right and left knee.  She had #1 performed last week without problems or complications.  Patient reports she is already noticing relief from the injections    Review of Systems  Pertinent items are noted in HPI.  All other systems were reviewed and are negative.    Physical Exam  Ht 5' (1.524 m)   Wt 70.3 kg (155 lb)   BMI 30.27 kg/m²   Cons: Appears well.  No apparent distress.  Psych: Alert. Oriented x3.  Mood and affect normal.  Right and left knee are without signs of infection.  There are no contraindications to proceeding with #2 in the right and left knee.            Studies Reviewed  Previous notes were reviewed    Large joint arthrocentesis: bilateral knee  Universal Protocol:  Consent: Verbal consent obtained.  Risks and benefits: risks, benefits and alternatives were discussed  Consent given by: patient  Time out: Immediately prior to procedure a \"time out\" was called to verify the correct patient, procedure, equipment, support staff and site/side marked as required.  Patient understanding: patient states understanding of the procedure being performed  Patient consent: the patient's understanding of the procedure matches consent given  Relevant documents: relevant documents present and " verified  Test results: test results available and properly labeled  Site marked: the operative site was marked  Radiology Images displayed and confirmed. If images not available, report reviewed: imaging studies available  Patient identity confirmed: verbally with patient  Supporting Documentation  Indications: pain     Is this a Visco injection? Yes  Non-Pharmacologic Treatments Attempted: Home Exercise  Pharmacologic Treatments Attempted: Over-the-counter NSAIDs  Pain Score: 6Procedure Details  Location: knee - bilateral knee  Preparation: Patient was prepped and draped in the usual sterile fashion  Needle size: 22 G  Ultrasound guidance: no  Approach: anterolateral    Medications (Right): 30 mg sodium hyaluronate 30 mg/2 mLMedications (Left): 30 mg sodium hyaluronate 30 mg/2 mL   Patient tolerance: patient tolerated the procedure well with no immediate complications  Dressing:  Sterile dressing applied        Medical, Surgical, Family, and Social History  The patient's medical history, family history, and social history, were reviewed and updated as appropriate.    Past Medical History:   Diagnosis Date    Anxiety     Cancer (HCC)     Uterine Ca and Skin    Hypertension        Past Surgical History:   Procedure Laterality Date    ABDOMINAL HYSTERECTOMY      with removal of both ovaries     HYSTERECTOMY         Family History   Problem Relation Age of Onset    Diabetes Mother     Heart disease Mother     Stroke Mother     Kidney cancer Brother        Social History     Occupational History    Not on file   Tobacco Use    Smoking status: Never    Smokeless tobacco: Never   Vaping Use    Vaping status: Never Used   Substance and Sexual Activity    Alcohol use: No     Comment: socially     Drug use: Never    Sexual activity: Not on file       Allergies   Allergen Reactions    Erythromycin Base Other (See Comments)     Rash DIARRHEA    Penicillins Other (See Comments)     ALL MYCINS RASH           Current Outpatient  Medications:     Ascorbic Acid (VITAMIN C PO), Take by mouth, Disp: , Rfl:     aspirin (ECOTRIN LOW STRENGTH) 81 mg EC tablet, Take 81 mg by mouth daily (Patient not taking: Reported on 11/13/2024), Disp: , Rfl:     Calcium Carbonate-Vitamin D 600-200 MG-UNIT CAPS, Take 1 tablet by mouth, Disp: , Rfl:     ELDERBERRY PO, Take by mouth, Disp: , Rfl:     LORazepam (ATIVAN) 0.5 mg tablet, Take 1 tablet (0.5 mg total) by mouth every 8 (eight) hours as needed for anxiety, Disp: 15 tablet, Rfl: 0    Multiple Vitamins-Minerals (ZINC PO), Take by mouth, Disp: , Rfl:     NON FORMULARY, Take 1 tablet by mouth 2 (two) times a day with meals CALCIUM CARBONATE-VITAMIN D (OS-EUSEBIA D) 500 MG-5 MCG (200 UNIT) PER TABLET, Disp: , Rfl:     PARoxetine (PAXIL) 20 mg tablet, Take 1 tablet (20 mg total) by mouth daily, Disp: 90 tablet, Rfl: 1    triamterene-hydrochlorothiazide (MAXZIDE-25) 37.5-25 mg per tablet, Take 1 tablet by mouth daily, Disp: 90 tablet, Rfl: 3      Vikram Cortez PA-C

## 2025-05-03 ENCOUNTER — PROCEDURE VISIT (OUTPATIENT)
Dept: OBGYN CLINIC | Facility: CLINIC | Age: 75
End: 2025-05-03
Payer: MEDICARE

## 2025-05-03 VITALS — HEIGHT: 60 IN | BODY MASS INDEX: 30.43 KG/M2 | WEIGHT: 155 LBS

## 2025-05-03 DIAGNOSIS — G89.29 CHRONIC PAIN OF RIGHT KNEE: Primary | ICD-10-CM

## 2025-05-03 DIAGNOSIS — M25.562 CHRONIC PAIN OF LEFT KNEE: ICD-10-CM

## 2025-05-03 DIAGNOSIS — M17.11 PRIMARY OSTEOARTHRITIS OF RIGHT KNEE: ICD-10-CM

## 2025-05-03 DIAGNOSIS — M25.561 CHRONIC PAIN OF RIGHT KNEE: Primary | ICD-10-CM

## 2025-05-03 DIAGNOSIS — M17.12 PRIMARY OSTEOARTHRITIS OF LEFT KNEE: ICD-10-CM

## 2025-05-03 DIAGNOSIS — G89.29 CHRONIC PAIN OF LEFT KNEE: ICD-10-CM

## 2025-05-03 PROCEDURE — 20610 DRAIN/INJ JOINT/BURSA W/O US: CPT | Performed by: PHYSICIAN ASSISTANT

## 2025-05-08 NOTE — PROGRESS NOTES
"Orthopaedic Surgery - Office Note  Gina Hassan (74 y.o. female)   : 1950   MRN: [id number]  Encounter Date: 5/10/2025    Chief Complaint   Patient presents with    Right Knee - Follow-up     Orthovisco #3    Left Knee - Follow-up     Orthovisco #3         Assessment & Plan  Primary osteoarthritis of left knee  Patient will watch for signs of infection and seek immediate medical care if they develop.  Patient may ice the knees for comfort.  Patient will follow all postinjection instructions.       Primary osteoarthritis of right knee  Patient will watch for signs of infection and seek immediate medical care if they develop.  Patient may ice the knees for comfort.  Patient will follow all postinjection instructions.       Chronic pain of right knee         Chronic pain of left knee            Return if symptoms worsen or fail to improve.        History of Present Illness  Patient is here for Orthovisc No. 3 in the right and left knee.  Patient denies any problems with the first 2 injections.    Review of Systems  Pertinent items are noted in HPI.  All other systems were reviewed and are negative.    Physical Exam  Ht 5' (1.524 m)   Wt 70.3 kg (155 lb)   BMI 30.27 kg/m²   Cons: Appears well.  No apparent distress.  Psych: Alert. Oriented x3.  Mood and affect normal.    Right knee are without signs of infection.  There are no contraindications to proceeding with #3 in bilateral knees.          Studies Reviewed      Large joint arthrocentesis: bilateral knee    Performed by: Vikram Cortez PA-C  Authorized by: Vikram Cortez PA-C    Universal Protocol:  Consent: Verbal consent obtained.  Risks and benefits: risks, benefits and alternatives were discussed  Consent given by: patient  Time out: Immediately prior to procedure a \"time out\" was called to verify the correct patient, procedure, equipment, support staff and site/side marked as required.  Patient understanding: patient states understanding of " the procedure being performed  Patient consent: the patient's understanding of the procedure matches consent given  Relevant documents: relevant documents present and verified  Test results: test results available and properly labeled  Site marked: the operative site was marked  Radiology Images displayed and confirmed. If images not available, report reviewed: imaging studies available  Patient identity confirmed: verbally with patient  Supporting Documentation  Indications: pain     Is this a Visco injection? Yes  Non-Pharmacologic Treatments Attempted: Home Exercise  Pharmacologic Treatments Attempted: Over-the-counter NSAIDs.  Pain Score: 6Procedure Details  Location: knee - bilateral knee  Needle size: 22 G  Ultrasound guidance: no    Medications (Right): 30 mg sodium hyaluronate 30 mg/2 mLMedications (Left): 30 mg sodium hyaluronate 30 mg/2 mL   Patient tolerance: patient tolerated the procedure well with no immediate complications  Dressing:  Sterile dressing applied        Medical, Surgical, Family, and Social History  The patient's medical history, family history, and social history, were reviewed and updated as appropriate.    Past Medical History:   Diagnosis Date    Anxiety     Cancer (HCC)     Uterine Ca and Skin    Hypertension        Past Surgical History:   Procedure Laterality Date    ABDOMINAL HYSTERECTOMY      with removal of both ovaries     HYSTERECTOMY         Family History   Problem Relation Age of Onset    Diabetes Mother     Heart disease Mother     Stroke Mother     Kidney cancer Brother        Social History     Occupational History    Not on file   Tobacco Use    Smoking status: Never    Smokeless tobacco: Never   Vaping Use    Vaping status: Never Used   Substance and Sexual Activity    Alcohol use: No     Comment: socially     Drug use: Never    Sexual activity: Not on file       Allergies   Allergen Reactions    Erythromycin Base Other (See Comments)     Rash DIARRHEA    Penicillins  Other (See Comments)     ALL MYCINS RASH           Current Outpatient Medications:     Ascorbic Acid (VITAMIN C PO), Take by mouth, Disp: , Rfl:     aspirin (ECOTRIN LOW STRENGTH) 81 mg EC tablet, Take 81 mg by mouth daily (Patient not taking: Reported on 11/13/2024), Disp: , Rfl:     Calcium Carbonate-Vitamin D 600-200 MG-UNIT CAPS, Take 1 tablet by mouth, Disp: , Rfl:     ELDERBERRY PO, Take by mouth, Disp: , Rfl:     LORazepam (ATIVAN) 0.5 mg tablet, Take 1 tablet (0.5 mg total) by mouth every 8 (eight) hours as needed for anxiety, Disp: 15 tablet, Rfl: 0    Multiple Vitamins-Minerals (ZINC PO), Take by mouth, Disp: , Rfl:     NON FORMULARY, Take 1 tablet by mouth 2 (two) times a day with meals CALCIUM CARBONATE-VITAMIN D (OS-EUSEBIA D) 500 MG-5 MCG (200 UNIT) PER TABLET, Disp: , Rfl:     PARoxetine (PAXIL) 20 mg tablet, Take 1 tablet (20 mg total) by mouth daily, Disp: 90 tablet, Rfl: 1    triamterene-hydrochlorothiazide (MAXZIDE-25) 37.5-25 mg per tablet, Take 1 tablet by mouth daily, Disp: 90 tablet, Rfl: 3      Vikram Cortez PA-C

## 2025-05-08 NOTE — PATIENT INSTRUCTIONS
Patient Education     Viscosupplementation   Why is this procedure done?   Your joints are where two bones meet. The ends of the bones are covered with a protective coating of cartilage. This helps them glide smoothly during movement. A fluid also helps the joint move more smoothly. With years of use, the cartilage may begin to wear away. This causes pain in the joints. This procedure is done to relieve the pain. A special fluid is used to help the bones glide more easily. It also acts like a shock absorber. This is most often done on the knee joints.  What will the results be?   Lubricates the joint  Less pain in the joints during movement or weight bearing  Improved joint mobility or movement  What happens before the procedure?   Your doctor may ask you to try other ways to treat osteoarthritis or OA, such as exercise, physical therapy, drugs for pain, applying hot compress, and losing weight.  Talk to your doctor about all the drugs you are taking. Be sure to include all prescription and over-the-counter (OTC) drugs, and herbal supplements. Tell the doctor about any drug allergy. Bring a list of drugs you take with you. Some drugs may interact with the injection.  What happens during the procedure?   Your doctor will clean the skin area where the injection will be given. You will be given a drug called local anesthesia. This will numb your skin and you will not feel any pain.  In some cases, your doctor might use imaging like x-ray or ultrasound to make sure they inject the right spot.  If you have excess fluid in your joint, your doctor may remove the fluid before beginning.  A needle will be used to inject the hyaluronic acid into the joint. Hyaluronic acid is a natural fluid in your body. It lubricates the joint to ease body movements.  The doctor will cover the injection site with a small bandage to prevent infection.  The procedure may only take a couple of minutes.  What happens after the procedure?   You  may feel slight pain, warmth, and swelling around the joint area.  You will be able to go home after the injection.  What care is needed at home?   Ask your doctor what you need to do when you go home. Make sure you ask questions if you do not understand what the doctor says. This way you will know what you need to do.  Place an ice pack or a bag of frozen peas wrapped in a towel over the painful part. Never put ice right on the skin. Do not leave the ice on more than 10 to 15 minutes at a time.  Rest for the first few days after the procedure. Avoid strenuous activities like heavy lifting, jogging, standing for a long time, and hard exercise.  What follow-up care is needed?   Your doctor may ask you to make visits to the office to check on your progress. Be sure to keep these visits. Your doctor may want you to have more injections.  What lifestyle changes are needed?   Ask your doctor about when you can go back to your normal activities like exercise or work.  What problems could happen?   Pain, redness, and swelling around the injection site  Infection of the joint  Fever  Allergic reaction  Itching  Rash  Bruising  Bleeding in the joint  No change in pain after injection  Last Reviewed Date   2021-03-30  Consumer Information Use and Disclaimer   This generalized information is a limited summary of diagnosis, treatment, and/or medication information. It is not meant to be comprehensive and should be used as a tool to help the user understand and/or assess potential diagnostic and treatment options. It does NOT include all information about conditions, treatments, medications, side effects, or risks that may apply to a specific patient. It is not intended to be medical advice or a substitute for the medical advice, diagnosis, or treatment of a health care provider based on the health care provider's examination and assessment of a patient’s specific and unique circumstances. Patients must speak with a health care  provider for complete information about their health, medical questions, and treatment options, including any risks or benefits regarding use of medications. This information does not endorse any treatments or medications as safe, effective, or approved for treating a specific patient. [employer] and its affiliates disclaim any warranty or liability relating to this information or the use thereof. The use of this information is governed by the Terms of Use, available at https://www.[employer]uwer.com/en/know/clinical-effectiveness-terms   Copyright   Copyright © 2024 [employer] and its affiliates and/or licensors. All rights reserved.

## 2025-05-10 ENCOUNTER — PROCEDURE VISIT (OUTPATIENT)
Dept: OBGYN CLINIC | Facility: CLINIC | Age: 75
End: 2025-05-10
Payer: MEDICARE

## 2025-05-10 VITALS — HEIGHT: 60 IN | BODY MASS INDEX: 30.43 KG/M2 | WEIGHT: 155 LBS

## 2025-05-10 DIAGNOSIS — G89.29 CHRONIC PAIN OF RIGHT KNEE: ICD-10-CM

## 2025-05-10 DIAGNOSIS — M17.11 PRIMARY OSTEOARTHRITIS OF RIGHT KNEE: ICD-10-CM

## 2025-05-10 DIAGNOSIS — M17.12 PRIMARY OSTEOARTHRITIS OF LEFT KNEE: Primary | ICD-10-CM

## 2025-05-10 DIAGNOSIS — G89.29 CHRONIC PAIN OF LEFT KNEE: ICD-10-CM

## 2025-05-10 DIAGNOSIS — M25.561 CHRONIC PAIN OF RIGHT KNEE: ICD-10-CM

## 2025-05-10 DIAGNOSIS — M25.562 CHRONIC PAIN OF LEFT KNEE: ICD-10-CM

## 2025-05-10 PROCEDURE — 20610 DRAIN/INJ JOINT/BURSA W/O US: CPT | Performed by: PHYSICIAN ASSISTANT

## 2025-05-15 ENCOUNTER — RA CDI HCC (OUTPATIENT)
Dept: OTHER | Facility: HOSPITAL | Age: 75
End: 2025-05-15

## 2025-05-15 ENCOUNTER — APPOINTMENT (OUTPATIENT)
Dept: LAB | Facility: CLINIC | Age: 75
End: 2025-05-15
Payer: MEDICARE

## 2025-05-15 DIAGNOSIS — I10 PRIMARY HYPERTENSION: ICD-10-CM

## 2025-05-15 DIAGNOSIS — Z11.59 NEED FOR HEPATITIS C SCREENING TEST: ICD-10-CM

## 2025-05-15 LAB
ALBUMIN SERPL BCG-MCNC: 4 G/DL (ref 3.5–5)
ALP SERPL-CCNC: 53 U/L (ref 34–104)
ALT SERPL W P-5'-P-CCNC: 12 U/L (ref 7–52)
ANION GAP SERPL CALCULATED.3IONS-SCNC: 5 MMOL/L (ref 4–13)
AST SERPL W P-5'-P-CCNC: 12 U/L (ref 13–39)
BASOPHILS # BLD AUTO: 0.05 THOUSANDS/ÂΜL (ref 0–0.1)
BASOPHILS NFR BLD AUTO: 1 % (ref 0–1)
BILIRUB SERPL-MCNC: 0.38 MG/DL (ref 0.2–1)
BUN SERPL-MCNC: 14 MG/DL (ref 5–25)
CALCIUM SERPL-MCNC: 9.2 MG/DL (ref 8.4–10.2)
CHLORIDE SERPL-SCNC: 107 MMOL/L (ref 96–108)
CHOLEST SERPL-MCNC: 194 MG/DL (ref ?–200)
CO2 SERPL-SCNC: 30 MMOL/L (ref 21–32)
CREAT SERPL-MCNC: 0.77 MG/DL (ref 0.6–1.3)
EOSINOPHIL # BLD AUTO: 0.1 THOUSAND/ÂΜL (ref 0–0.61)
EOSINOPHIL NFR BLD AUTO: 1 % (ref 0–6)
ERYTHROCYTE [DISTWIDTH] IN BLOOD BY AUTOMATED COUNT: 13.2 % (ref 11.6–15.1)
GFR SERPL CREATININE-BSD FRML MDRD: 75 ML/MIN/1.73SQ M
GLUCOSE P FAST SERPL-MCNC: 87 MG/DL (ref 65–99)
HCT VFR BLD AUTO: 42.3 % (ref 34.8–46.1)
HCV AB SER QL: NORMAL
HDLC SERPL-MCNC: 67 MG/DL
HGB BLD-MCNC: 13.2 G/DL (ref 11.5–15.4)
IMM GRANULOCYTES # BLD AUTO: 0.02 THOUSAND/UL (ref 0–0.2)
IMM GRANULOCYTES NFR BLD AUTO: 0 % (ref 0–2)
LDLC SERPL CALC-MCNC: 105 MG/DL (ref 0–100)
LYMPHOCYTES # BLD AUTO: 2.12 THOUSANDS/ÂΜL (ref 0.6–4.47)
LYMPHOCYTES NFR BLD AUTO: 31 % (ref 14–44)
MCH RBC QN AUTO: 29.7 PG (ref 26.8–34.3)
MCHC RBC AUTO-ENTMCNC: 31.2 G/DL (ref 31.4–37.4)
MCV RBC AUTO: 95 FL (ref 82–98)
MONOCYTES # BLD AUTO: 0.67 THOUSAND/ÂΜL (ref 0.17–1.22)
MONOCYTES NFR BLD AUTO: 10 % (ref 4–12)
NEUTROPHILS # BLD AUTO: 3.95 THOUSANDS/ÂΜL (ref 1.85–7.62)
NEUTS SEG NFR BLD AUTO: 57 % (ref 43–75)
NONHDLC SERPL-MCNC: 127 MG/DL
NRBC BLD AUTO-RTO: 0 /100 WBCS
PLATELET # BLD AUTO: 315 THOUSANDS/UL (ref 149–390)
PMV BLD AUTO: 11.3 FL (ref 8.9–12.7)
POTASSIUM SERPL-SCNC: 4.7 MMOL/L (ref 3.5–5.3)
PROT SERPL-MCNC: 6.2 G/DL (ref 6.4–8.4)
RBC # BLD AUTO: 4.44 MILLION/UL (ref 3.81–5.12)
SODIUM SERPL-SCNC: 142 MMOL/L (ref 135–147)
TRIGL SERPL-MCNC: 111 MG/DL (ref ?–150)
TSH SERPL DL<=0.05 MIU/L-ACNC: 2.54 UIU/ML (ref 0.45–4.5)
WBC # BLD AUTO: 6.91 THOUSAND/UL (ref 4.31–10.16)

## 2025-05-15 PROCEDURE — 80061 LIPID PANEL: CPT

## 2025-05-15 PROCEDURE — 85025 COMPLETE CBC W/AUTO DIFF WBC: CPT

## 2025-05-15 PROCEDURE — 84443 ASSAY THYROID STIM HORMONE: CPT

## 2025-05-15 PROCEDURE — 86803 HEPATITIS C AB TEST: CPT

## 2025-05-15 PROCEDURE — 36415 COLL VENOUS BLD VENIPUNCTURE: CPT

## 2025-05-15 PROCEDURE — 80053 COMPREHEN METABOLIC PANEL: CPT

## 2025-05-16 ENCOUNTER — TELEPHONE (OUTPATIENT)
Age: 75
End: 2025-05-16

## 2025-05-16 NOTE — TELEPHONE ENCOUNTER
Patient had blood work done yesterday.  She is calling in to go over results.    Needs Primary Care Provider review.  Please advise.

## 2025-05-19 NOTE — TELEPHONE ENCOUNTER
The patient called regarding her test results . We provide the message from the provider and she understood

## 2025-05-21 DIAGNOSIS — I10 ESSENTIAL HYPERTENSION: ICD-10-CM

## 2025-05-21 RX ORDER — TRIAMTERENE AND HYDROCHLOROTHIAZIDE 37.5; 25 MG/1; MG/1
1 TABLET ORAL DAILY
Qty: 90 TABLET | Refills: 0 | Status: SHIPPED | OUTPATIENT
Start: 2025-05-21

## 2025-05-21 NOTE — TELEPHONE ENCOUNTER
Reason for call:   [x] Refill   [] Prior Auth  [] Other:     Office:   [x] PCP/Provider -   Ordering Department: [facility]  Authorized By: Oneil Tee MD  [] Specialty/Provider -     Medication:  triamterene-hydrochlorothiazide (MAXZIDE-25) 37.5-25 mg per tablet    Dose/Frequency:  Take 1 tablet by mouth daily     Quantity: 90    Pharmacy: [address] [phone]    Local Pharmacy   Does the patient have enough for 3 days?   [] Yes   [x] No - Send as HP to POD    Mail Away Pharmacy   Does the patient have enough for 10 days?   [] Yes   [x] No - Send as HP to POD

## 2025-08-15 DIAGNOSIS — I10 ESSENTIAL HYPERTENSION: ICD-10-CM

## 2025-08-20 RX ORDER — TRIAMTERENE AND HYDROCHLOROTHIAZIDE 37.5; 25 MG/1; MG/1
1 TABLET ORAL DAILY
Qty: 90 TABLET | Refills: 0 | OUTPATIENT
Start: 2025-08-20